# Patient Record
Sex: MALE | Race: WHITE | URBAN - METROPOLITAN AREA
[De-identification: names, ages, dates, MRNs, and addresses within clinical notes are randomized per-mention and may not be internally consistent; named-entity substitution may affect disease eponyms.]

---

## 2017-03-13 ENCOUNTER — TRANSFERRED RECORDS (OUTPATIENT)
Dept: HEALTH INFORMATION MANAGEMENT | Facility: CLINIC | Age: 57
End: 2017-03-13

## 2017-03-21 ENCOUNTER — TRANSFERRED RECORDS (OUTPATIENT)
Dept: HEALTH INFORMATION MANAGEMENT | Facility: CLINIC | Age: 57
End: 2017-03-21

## 2017-04-03 ENCOUNTER — TRANSFERRED RECORDS (OUTPATIENT)
Dept: HEALTH INFORMATION MANAGEMENT | Facility: CLINIC | Age: 57
End: 2017-04-03

## 2018-02-08 ENCOUNTER — TRANSFERRED RECORDS (OUTPATIENT)
Dept: HEALTH INFORMATION MANAGEMENT | Facility: CLINIC | Age: 58
End: 2018-02-08

## 2018-02-26 ENCOUNTER — TRANSFERRED RECORDS (OUTPATIENT)
Dept: HEALTH INFORMATION MANAGEMENT | Facility: CLINIC | Age: 58
End: 2018-02-26

## 2018-03-26 ENCOUNTER — PRE VISIT (OUTPATIENT)
Dept: UROLOGY | Facility: CLINIC | Age: 58
End: 2018-03-26

## 2018-03-26 DIAGNOSIS — N35.919 URETHRAL STRICTURE: Primary | ICD-10-CM

## 2018-03-26 NOTE — TELEPHONE ENCOUNTER
RECORDS RECEIVED FROM: [facility] UROLOGY IN PROCESS   DATE RECEIVED: IN PROCESS   NOTES STATUS DETAILS   OFFICE NOTE from referring provider {Records Status:889553    OFFICE NOTES from other specialists {Records Status:[id number]    DISCHARGE SUMMARY from hospital {Records Status:550707    DISCHARGE REPORT from the ER {Records Status:253953    OPERATIVE REPORT {Records Status:[id number]    MEDICATION LIST {Records Status:252378    LABS     URINALYSIS (UA) {Records Status:273720    URINE CYTOLOGY {Records Status:785765    DIAGNOSIS SPECIFIC LABS     RUG (IMAGES & REPORT) and VCUG (IMAGES & REPORT) REQUEST SENT TO [facility] UROLOGY.. CDK {Records Status:[id number] IN PROCESS

## 2018-04-16 ENCOUNTER — TELEPHONE (OUTPATIENT)
Dept: UROLOGY | Facility: CLINIC | Age: 58
End: 2018-04-16

## 2018-04-16 NOTE — TELEPHONE ENCOUNTER
----- Message from Vilma Clayton RN sent at 3/26/2018  2:27 PM CDT -----  Regarding: FW: NEW URETHRAL STX  Can you please schedule a VCUG and RUG. THanks   ----- Message -----     From: Mera Lopes     Sent: 3/26/2018   9:06 AM       To: Angelique West RN  Subject: NEW URETHRAL STX                                 Please put in order and send to .. ck

## 2018-05-04 ENCOUNTER — RADIANT APPOINTMENT (OUTPATIENT)
Dept: GENERAL RADIOLOGY | Facility: CLINIC | Age: 58
End: 2018-05-04
Attending: UROLOGY
Payer: COMMERCIAL

## 2018-05-04 DIAGNOSIS — N35.919 URETHRAL STRICTURE: ICD-10-CM

## 2018-05-04 ASSESSMENT — ENCOUNTER SYMPTOMS
DYSURIA: 1
HEMATURIA: 0
DIFFICULTY URINATING: 1
FLANK PAIN: 0

## 2018-05-08 ENCOUNTER — PRE VISIT (OUTPATIENT)
Dept: UROLOGY | Facility: CLINIC | Age: 58
End: 2018-05-08

## 2018-05-08 NOTE — TELEPHONE ENCOUNTER
Reason for visit: urethral stricture follow up     Relevant information: VCUG was unsuccessful. Pt has a history of prostate cancer with brachytherapy    Records/imaging/labs: RUG available    Pt called: yes, generic message left    Rooming: flow/pvr

## 2018-05-14 ENCOUNTER — OFFICE VISIT (OUTPATIENT)
Dept: UROLOGY | Facility: CLINIC | Age: 58
End: 2018-05-14
Payer: COMMERCIAL

## 2018-05-14 ENCOUNTER — ALLIED HEALTH/NURSE VISIT (OUTPATIENT)
Dept: UROLOGY | Facility: CLINIC | Age: 58
End: 2018-05-14
Payer: COMMERCIAL

## 2018-05-14 VITALS
DIASTOLIC BLOOD PRESSURE: 84 MMHG | HEART RATE: 69 BPM | BODY MASS INDEX: 29.63 KG/M2 | WEIGHT: 207 LBS | SYSTOLIC BLOOD PRESSURE: 197 MMHG | HEIGHT: 70 IN

## 2018-05-14 DIAGNOSIS — C61 MALIGNANT NEOPLASM OF PROSTATE (H): ICD-10-CM

## 2018-05-14 DIAGNOSIS — N99.112 POSTPROCEDURAL MEMBRANOUS URETHRAL STRICTURE: Primary | ICD-10-CM

## 2018-05-14 DIAGNOSIS — T66.XXXS RADIATION ADVERSE EFFECT, SEQUELA: ICD-10-CM

## 2018-05-14 RX ORDER — CEFAZOLIN SODIUM 1 G/50ML
1 INJECTION, SOLUTION INTRAVENOUS SEE ADMIN INSTRUCTIONS
Status: CANCELLED | OUTPATIENT
Start: 2018-05-14

## 2018-05-14 ASSESSMENT — PAIN SCALES - GENERAL: PAINLEVEL: NO PAIN (0)

## 2018-05-14 NOTE — MR AVS SNAPSHOT
After Visit Summary   5/14/2018    Jimmie Garcia    MRN: [id number]           Patient Information     Date Of Birth          1960        Visit Information        Provider Department      5/14/2018 8:00 AM Scottie Madera MD [facility]rology and [facility]        Today's Diagnoses     Postprocedural membranous urethral stricture    -  1    Radiation adverse effect, sequela        Malignant neoplasm of prostate (H)          Care Instructions    Patient scheduled surgery.      Amber Beal MA      It was a pleasure meeting with you today.  Thank you for allowing me and my team the privilege of caring for you today.  YOU are the reason we are here, and I truly hope we provided you with the excellent service you deserve.  Please let us know if there is anything else we can do for you so that we can be sure you are leaving completely satisfied with your care experience.                Follow-ups after your visit        Your next 10 appointments already scheduled     Jun 29, 2018   Procedure with Scottie Madera MD   [facility] ([facility])    [address] [place] [address]  [address] [place] [id number] [phone]           Located in the Clinics and Surgery Center at [address] [place] [address].   parking is very convenient and highly recommended.  is a $6 flat rate fee.  Both  and self parkers should enter the main arrival plaza from [facility]; parking attendants will direct you based on your parking preference.            Aug 06, 2018 10:30 AM CDT   (Arrive by 10:15 AM)   Post-Op with Adrian Castro MD   [facility] and [facility] ([facility])    [address] [place] [address]  [address]th [facility] [address] [phone]              Who to contact     Please call your clinic at [phone] to:    Ask questions  "about your health    Make or cancel appointments    Discuss your medicines    Learn about your test results    Speak to your doctor            Additional Information About Your Visit        Resource [employer] Information     [employer] gives you secure access to your electronic health record. If you see a primary care provider, you can also send messages to your care team and make appointments. If you have questions, please call your primary care clinic.  If you do not have a primary care provider, please call [phone] and they will assist you.      [employer] is an electronic gateway that provides easy, online access to your medical records. With [employer], you can request a clinic appointment, read your test results, renew a prescription or communicate with your care team.     To access your existing account, please contact your [facility] or call [phone] for assistance.        Care EveryWhere ID     This is your Care EveryWhere ID. This could be used by other organizations to access your [place] medical records  [id number]        Your Vitals Were     Pulse Height BMI (Body Mass Index)             69 1.765 m (5' 9.5\") 30.13 kg/m2          Blood Pressure from Last 3 Encounters:   05/14/18 197/84    Weight from Last 3 Encounters:   05/14/18 93.9 kg (207 lb)              We Performed the Following     COMPLEX UROFLOWMETRY     [place]le-Operative Worksheet  (Urology General)     POST-VOID RESIDUAL BLADDER SCAN        Primary Care Provider    None Specified       No primary provider on file.        Equal Access to Services     ELLI JERONIMO : Hadii chito cantu hadasho Soomaali, waaxda luqadaha, qaybta kaalmada adeegyada, tevin rehman . So [facility] [phone].    ATENCIÓN: Si habla español, tiene a almazan disposición servicios gratuitos de asistencia lingüística. Llame al [phone].    We comply with applicable federal civil rights laws and [place] laws. We do not discriminate on " the basis of race, color, national origin, age, disability, sex, sexual orientation, or gender identity.            Thank you!     Thank you for choosing [facility] UROLOGY AND [facility] FOR PROSTATE AND UROLOGIC CANCERS  for your care. Our goal is always to provide you with excellent care. Hearing back from our patients is one way we can continue to improve our services. Please take a few minutes to complete the written survey that you may receive in the mail after your visit with us. Thank you!             Your Updated Medication List - Protect others around you: Learn how to safely use, store and throw away your medicines at www.disposemymeds.org.          This list is accurate as of 5/14/18 12:48 PM.  Always use your most recent med list.                   Brand Name Dispense Instructions for use Diagnosis    MULTIPLE VITAMIN PO           omeprazole 20 MG CR capsule    priLOSEC     Take 20 mg by mouth

## 2018-05-14 NOTE — LETTER
5/14/2018       RE: Jimmie Garcia  [address]  [facility] [address]     Dear Colleague,    Thank you for referring your patient, Jimmie Garcia, to the [facility] UROLOGY AND INST FOR PROSTATE AND UROLOGIC CANCERS at [facility]. Please see a copy of my visit note below.    UROLOGY CONSULT HISTORY & PHYSICAL    Name: Jimmie Garcia    MRN: [id number]   YOB: 1960         CHIEF COMPLAINT:  Urethral stricture    HISTORY OF PRESENT ILLNESS:  Mr. Garcia is a 57 year old male seen in consultation from Dr. Valerio for urethral stricture. Symptoms include slow stream and sense of incomplete emptying. He has not had prior treatments for his urethral stricture.     He does not have a history of self dilation. He currently does not perform self dilation. He does not currently have an indwelling catheter.    Etiology: He has a history of prostate cancer s/p brachytherapy in 2012. He also has a history of a single low grade bladder tumor s/p TURBT in 2014 with no recurrences since; he has regularly been undergoing surveillance cystoscopy. He began having symptoms of weak stream and incomplete emptying in December 2017. He then underwent cystoscopy in March 2018 which showed a membranous stricture which could not be passed with the cystoscope. He denies a history of sexually transmitted diseases. He denies a history of straddle injury. He denies a history of pelvic fracture. He confirms a history of urethral catheterization or instrumentation as per above.    He currently has normal erectile function. He has occasional urge incontinence but can play an entire round of golf without needing to go to the restroom. He has no stress incontinence.     REVIEW OF QUESTIONNAIRES:  Questionnaires reviewed. See flowsheet for details.    PMH notable for GERD and prostate cancer and urethral stricture. .     No surgical history other than aforementioned brachytherapy and TURBT.     Current  "Outpatient Prescriptions   Medication     MULTIPLE VITAMIN PO     omeprazole (PRILOSEC) 20 MG CR capsule     No current facility-administered medications for this visit.      Facility-Administered Medications Ordered in Other Visits   Medication     iopamidol (ISOVUE-250) 51% solution 100 mL       Allergies as of 05/14/2018     (Not on File)       No family history on file.    Social History     Social History     Marital status:      Spouse name: N/A     Number of children: N/A     Years of education: N/A     Occupational History     Not on file.     Social History Main Topics     Smoking status: Former Smoker     Quit date: 04/1993     Smokeless tobacco: Never Used     Alcohol use Not on file     Drug use: Not on file     Sexual activity: Not on file     Other Topics Concern     Not on file     Social History Narrative     No narrative on file       REVIEW OF SYSTEMS:   See HPI for pertinent details.  Remainder of 10-point ROS negative.    PHYSICAL EXAM:  General: Well-dressed, well-nourished man in no acute distress  Vitals: /84  Pulse 69  Ht 1.765 m (5' 9.5\")  Wt 93.9 kg (207 lb)  BMI 30.13 kg/m2 Estimated body mass index is 30.13 kg/(m^2) as calculated from the following:    Height as of this encounter: 1.765 m (5' 9.5\").    Weight as of this encounter: 93.9 kg (207 lb).  Eyes: Anicteric, EOMI  Lymph: No cervical, supraclavicular lymphadenopathy  Lungs: No respiratory distress  Heart: Regular rate and rhythm  Abdomen: mildly obese, soft, nontender, without masses.  There are no surgical scars  Back: Flanks are nontender  : Circumcised phallus. Meatal stenosis is absent, penile plaques are absent. Skin lesions are absent.  There is not firmness along the course of the entire urethra.  Rectal examination was not performed  Neurologic: Grossly nonfocal    REVIEW OF IMAGING STUDIES:  Retrograde urethrogram and attempted voiding cystourethrogram were performed earlier and the images were " independently interpreted by me and are reviewed with the patient.  These demonstrate a membranous urethral stricture that is obliterative immediately distal to the apical brachytherapy seeds.  No contrast is seen proximal to the stricture.         REVIEW OF OFFICE STUDIES:  Urinary Flow Rate  Peak Flow: 10.4 mL/s  Average Flow: 3.7 mL/s  Voided (mL): 148 mL  Residual Volume by Ultrasound: 210 mL  Character of Curve: Plateau    REVIEW OF OUTSIDE RECORDS:  I reviewed outside records for 10 minutes.  Findings include: urethral stricture diagnosed on cystoscopy with Dr. Valerio in 3/2018.     ASSESSMENT/PLAN:  A 57 year old gentleman with urethral stricture after brachytherapy.  He has been managed by prior urologist(s) and is referred to our center for advanced treatment options.    -We discussed management options including urethral dilation versus urethroplasty versus salvage prostatectomy based on the extent of his stricture disease.   -Will plan for suprapubic tube placement with antegrade urethrogram and antegrade cystoscopy to assess proximal extent of stricture. We discussed that the exact nature of his surgery will depend on the findings of his antegrade studies but that he would have a urethral catheter in place for a minimum of 4-6 weeks following surgery. We discussed a general success rate of 80% for post-radiation urethroplasty but that we will discuss more specific details following the results of his antegrade studies.     Patient was seen and examined with Dr. Madera    --  =======================================================  As the attending surgeon I, Scottie Madera, interviewed and examined the patient. The plan was developed between me and the patient. My findings and plan are as stated by the resident.    MD Terry Hernandez MD  Urology Resident    7:52 AM, 5/14/2018

## 2018-05-14 NOTE — PROGRESS NOTES
UROLOGY CONSULT HISTORY & PHYSICAL    Name: Jimmie Garcia    MRN: [id number]   YOB: 1960         CHIEF COMPLAINT:  Urethral stricture    HISTORY OF PRESENT ILLNESS:  Mr. Garcia is a 57 year old male seen in consultation from Dr. Valerio for urethral stricture. Symptoms include slow stream and sense of incomplete emptying. He has not had prior treatments for his urethral stricture.     He does not have a history of self dilation. He currently does not perform self dilation. He does not currently have an indwelling catheter.    Etiology: He has a history of prostate cancer s/p brachytherapy in 2012. He also has a history of a single low grade bladder tumor s/p TURBT in 2014 with no recurrences since; he has regularly been undergoing surveillance cystoscopy. He began having symptoms of weak stream and incomplete emptying in December 2017. He then underwent cystoscopy in March 2018 which showed a membranous stricture which could not be passed with the cystoscope. He denies a history of sexually transmitted diseases. He denies a history of straddle injury. He denies a history of pelvic fracture. He confirms a history of urethral catheterization or instrumentation as per above.    He currently has normal erectile function. He has occasional urge incontinence but can play an entire round of golf without needing to go to the restroom. He has no stress incontinence.     REVIEW OF QUESTIONNAIRES:  Questionnaires reviewed. See flowsheet for details.    PMH notable for GERD and prostate cancer and urethral stricture. .     No surgical history other than aforementioned brachytherapy and TURBT.     Current Outpatient Prescriptions   Medication     MULTIPLE VITAMIN PO     omeprazole (PRILOSEC) 20 MG CR capsule     No current facility-administered medications for this visit.      Facility-Administered Medications Ordered in Other Visits   Medication     iopamidol (ISOVUE-250) 51% solution 100 mL       Allergies as of  "05/14/2018     (Not on File)       No family history on file.    Social History     Social History     Marital status:      Spouse name: N/A     Number of children: N/A     Years of education: N/A     Occupational History     Not on file.     Social History Main Topics     Smoking status: Former Smoker     Quit date: 04/1993     Smokeless tobacco: Never Used     Alcohol use Not on file     Drug use: Not on file     Sexual activity: Not on file     Other Topics Concern     Not on file     Social History Narrative     No narrative on file       REVIEW OF SYSTEMS:   See HPI for pertinent details.  Remainder of 10-point ROS negative.    PHYSICAL EXAM:  General: Well-dressed, well-nourished man in no acute distress  Vitals: /84  Pulse 69  Ht 1.765 m (5' 9.5\")  Wt 93.9 kg (207 lb)  BMI 30.13 kg/m2 Estimated body mass index is 30.13 kg/(m^2) as calculated from the following:    Height as of this encounter: 1.765 m (5' 9.5\").    Weight as of this encounter: 93.9 kg (207 lb).  Eyes: Anicteric, EOMI  Lymph: No cervical, supraclavicular lymphadenopathy  Lungs: No respiratory distress  Heart: Regular rate and rhythm  Abdomen: mildly obese, soft, nontender, without masses.  There are no surgical scars  Back: Flanks are nontender  : Circumcised phallus. Meatal stenosis is absent, penile plaques are absent. Skin lesions are absent.  There is not firmness along the course of the entire urethra.  Rectal examination was not performed  Neurologic: Grossly nonfocal    REVIEW OF IMAGING STUDIES:  Retrograde urethrogram and attempted voiding cystourethrogram were performed earlier and the images were independently interpreted by me and are reviewed with the patient.  These demonstrate a membranous urethral stricture that is obliterative immediately distal to the apical brachytherapy seeds.  No contrast is seen proximal to the stricture.         REVIEW OF OFFICE STUDIES:  Urinary Flow Rate  Peak Flow: 10.4 mL/s  Average " Flow: 3.7 mL/s  Voided (mL): 148 mL  Residual Volume by Ultrasound: 210 mL  Character of Curve: Plateau    REVIEW OF OUTSIDE RECORDS:  I reviewed outside records for 10 minutes.  Findings include: urethral stricture diagnosed on cystoscopy with Dr. Valerio in 3/2018.     ASSESSMENT/PLAN:  A 57 year old gentleman with urethral stricture after brachytherapy.  He has been managed by prior urologist(s) and is referred to our center for advanced treatment options.    -We discussed management options including urethral dilation versus urethroplasty versus salvage prostatectomy based on the extent of his stricture disease.   -Will plan for suprapubic tube placement with antegrade urethrogram and antegrade cystoscopy to assess proximal extent of stricture. We discussed that the exact nature of his surgery will depend on the findings of his antegrade studies but that he would have a urethral catheter in place for a minimum of 4-6 weeks following surgery. We discussed a general success rate of 80% for post-radiation urethroplasty but that we will discuss more specific details following the results of his antegrade studies.     Patient was seen and examined with Dr. Madera    --  =======================================================  As the attending surgeon I, Scottie Madera, interviewed and examined the patient. The plan was developed between me and the patient. My findings and plan are as stated by the resident.    MD Terry Hernandez MD  Urology Resident    7:52 AM, 5/14/2018    Answers for HPI/ROS submitted by the patient on 5/4/2018   General Symptoms: No  Skin Symptoms: No  HENT Symptoms: No  EYE SYMPTOMS: No  HEART SYMPTOMS: No  LUNG SYMPTOMS: No  INTESTINAL SYMPTOMS: No  URINARY SYMPTOMS: Yes  REPRODUCTIVE SYMPTOMS: No  SKELETAL SYMPTOMS: No  BLOOD SYMPTOMS: No  NERVOUS SYSTEM SYMPTOMS: No  MENTAL HEALTH SYMPTOMS: No  Trouble holding urine or incontinence: Yes  Pain or burning: Yes  Trouble  starting or stopping: Yes  Increased frequency of urination: Yes  Blood in urine: No  Decreased frequency of urination: No  Frequent nighttime urination: No  Flank pain: No  Difficulty emptying bladder: Yes

## 2018-05-14 NOTE — NURSING NOTE
"Chief Complaint   Patient presents with     Consult     Urethral stricture       Blood pressure 197/84, pulse 69, height 1.765 m (5' 9.5\"), weight 93.9 kg (207 lb). Body mass index is 30.13 kg/(m^2).    There is no problem list on file for this patient.      Not on File    Current Outpatient Prescriptions   Medication Sig Dispense Refill     MULTIPLE VITAMIN PO        omeprazole (PRILOSEC) 20 MG CR capsule Take 20 mg by mouth         Social History   Substance Use Topics     Smoking status: Former Smoker     Quit date: 04/1993     Smokeless tobacco: Never Used     Alcohol use Not on file       ANA M Mcnamara  5/14/2018  7:25 AM       "

## 2018-05-14 NOTE — PROGRESS NOTES
Pre Op Teaching Flowsheet       Pre and Post op Patient Education  Relevant Diagnosis:  Postprocedural membranous urethral stricture   Teaching Topic:  Pre and post op teaching for Cystoscopy and insertion of suprapubic cystostomy tube  Person Involved in teaching:  Jimmie Garcia      Motivation Level:  Asks Questions: Yes  Eager to Learn:  Yes  Cooperative: Yes  Receptive (willing/able to accept information):  Yes  Patient demonstrates understanding of the following:  Date and time of surgery:  6/29/18 at 0910  Location of surgery: [facility]- 5th Floor  History and Physical and any other testing necessary prior to surgery: Yes  Required time line for completion of History and Physical and any pre-op testing: Yes    NPO Guidelines: NPO per Anesthesia Guidelines    Patient demonstrates understanding of the following:  Patient understands the need for a responsible adult to drive them home and someone to stay with them for the first 24 hours post-operatively: YES Wife  Pre-op bowel prep: No, explain  Pre-op showering/scrub information with Hibiclens Soap: Yes  Medications to take the day of surgery:  Per PCP  Blood thinner medications discussed and when to stop (if applicable):  Yes  Diabetes medication management (if applicable):  Patient to discuss with Primary Care Provider  Discussed pain control after surgery: pain scale, pain medications and pain management techniques  Infection Prevention: Patient demonstrates understanding of the following:  Patient instructed on hand hygiene:  Yes  Surgical procedure site care taught: Yes  Signs and symptoms of infection taught:  Yes  Wound care will be taught at the time of discharge.  Central venous catheter care will be taught at the time of discharge (if applicable).    Post-op follow-up:  Discussed how to contact the hospital, nurse, and clinic scheduling staff if necessary.    Instructional materials used/given/mailed:  [facility]  Surgery Booklet, post op teaching sheet, Map, Soap, and arrival/location information.    Surgical instructions given to patient in clinic: Yes.    Instructional Materials given:  Before your surgery packet , Medications to avoid before surgery , Showering or Bathing instructions before surgery  and What to expect after surgery    Post-op appointment/testing scheduled per MD orders: Yes, 8/6/18 at 1030 with Dr. Castro    Total time with patient: 10 minutes    Angelique West RN  Urology Care Coordinator

## 2018-05-14 NOTE — MR AVS SNAPSHOT
After Visit Summary   5/14/2018    Jimmie Garcia    MRN: [id number]           Patient Information     Date Of Birth          1960        Visit Information        Provider Department      5/14/2018 9:00 AM Nurse, [facility]        Today's Diagnoses     Postprocedural membranous urethral stricture    -  1       Follow-ups after your visit        Your next 10 appointments already scheduled     Jun 29, 2018   Procedure with Scottie Madera MD   [facility] ([facility])    [address] [place] [address]  5th [facility] [address] [phone]           Located in the Clinics and Surgery Center at [address] [place] [address].   parking is very convenient and highly recommended.  is a $6 flat rate fee.  Both  and self parkers should enter the main arrival plaza from [facility]; parking attendants will direct you based on your parking preference.            Aug 06, 2018  1:30 PM CDT   (Arrive by 1:15 PM)   Post-Op with Adrian Castro MD   [facility] ([facility])    [address] [place] [address]  4th [facility] [address] [phone]              Who to contact     Please call your clinic at [phone] to:    Ask questions about your health    Make or cancel appointments    Discuss your medicines    Learn about your test results    Speak to your doctor            Additional Information About Your Visit        MyChart Information     [employer] gives you secure access to your electronic health record. If you see a primary care provider, you can also send messages to your care team and make appointments. If you have questions, please call your primary care clinic.  If you do not have a primary care provider, please call [phone] and they will assist you.      [employer] is  an electronic gateway that provides easy, online access to your medical records. With [employer], you can request a clinic appointment, read your test results, renew a prescription or communicate with your care team.     To access your existing account, please contact your [facility] or call [phone] for assistance.        Care EveryWhere ID     This is your Care EveryWhere ID. This could be used by other organizations to access your [facility] medical records  [id number]         Blood Pressure from Last 3 Encounters:   05/14/18 197/84    Weight from Last 3 Encounters:   05/14/18 93.9 kg (207 lb)              Today, you had the following     No orders found for display       Primary Care Provider    None Specified       No primary provider on file.        Equal Access to Services     ELLI JERONIMO : Jarrett Jimenez, duane figueredo, temi alvarenga, tevin rehman . So [facility] [phone].    ATENCIÓN: Si habla español, tiene a almazan disposición servicios gratuitos de asistencia lingüística. Llame al [phone].    We comply with applicable federal civil rights laws and [place] laws. We do not discriminate on the basis of race, color, national origin, age, disability, sex, sexual orientation, or gender identity.            Thank you!     Thank you for choosing [facility] AND [facility]  for your care. Our goal is always to provide you with excellent care. Hearing back from our patients is one way we can continue to improve our services. Please take a few minutes to complete the written survey that you may receive in the mail after your visit with us. Thank you!             Your Updated Medication List - Protect others around you: Learn how to safely use, store and throw away your medicines at www.disposemymeds.org.          This list is accurate as of 5/14/18  9:18 AM.  Always use your most recent med  list.                   Brand Name Dispense Instructions for use Diagnosis    MULTIPLE VITAMIN PO           omeprazole 20 MG CR capsule    priLOSEC     Take 20 mg by mouth

## 2018-05-14 NOTE — PATIENT INSTRUCTIONS
Patient scheduled surgery.      Amber Beal MA      It was a pleasure meeting with you today.  Thank you for allowing me and my team the privilege of caring for you today.  YOU are the reason we are here, and I truly hope we provided you with the excellent service you deserve.  Please let us know if there is anything else we can do for you so that we can be sure you are leaving completely satisfied with your care experience.

## 2018-06-28 ENCOUNTER — ANESTHESIA EVENT (OUTPATIENT)
Dept: SURGERY | Facility: AMBULATORY SURGERY CENTER | Age: 58
End: 2018-06-28

## 2018-06-29 ENCOUNTER — ANESTHESIA (OUTPATIENT)
Dept: SURGERY | Facility: AMBULATORY SURGERY CENTER | Age: 58
End: 2018-06-29

## 2018-06-29 ENCOUNTER — RADIANT APPOINTMENT (OUTPATIENT)
Dept: RADIOLOGY | Facility: AMBULATORY SURGERY CENTER | Age: 58
End: 2018-06-29
Attending: UROLOGY

## 2018-06-29 ENCOUNTER — SURGERY (OUTPATIENT)
Age: 58
End: 2018-06-29

## 2018-06-29 ENCOUNTER — HOSPITAL ENCOUNTER (OUTPATIENT)
Facility: AMBULATORY SURGERY CENTER | Age: 58
End: 2018-06-29
Attending: UROLOGY
Payer: COMMERCIAL

## 2018-06-29 ENCOUNTER — TELEPHONE (OUTPATIENT)
Dept: UROLOGY | Facility: CLINIC | Age: 58
End: 2018-06-29

## 2018-06-29 VITALS
OXYGEN SATURATION: 100 % | HEIGHT: 70 IN | HEART RATE: 62 BPM | TEMPERATURE: 96.8 F | BODY MASS INDEX: 29.35 KG/M2 | WEIGHT: 205 LBS | RESPIRATION RATE: 16 BRPM | DIASTOLIC BLOOD PRESSURE: 70 MMHG | SYSTOLIC BLOOD PRESSURE: 123 MMHG

## 2018-06-29 DIAGNOSIS — N35.919 URETHRAL STRICTURE: ICD-10-CM

## 2018-06-29 DIAGNOSIS — N35.014 POST-TRAUMATIC MALE URETHRAL STRICTURE: Primary | ICD-10-CM

## 2018-06-29 DIAGNOSIS — N35.919 URETHRAL STRICTURE: Primary | ICD-10-CM

## 2018-06-29 DIAGNOSIS — N35.913 MEMBRANOUS URETHRAL STRICTURE: Primary | ICD-10-CM

## 2018-06-29 RX ORDER — SODIUM CHLORIDE, SODIUM LACTATE, POTASSIUM CHLORIDE, CALCIUM CHLORIDE 600; 310; 30; 20 MG/100ML; MG/100ML; MG/100ML; MG/100ML
INJECTION, SOLUTION INTRAVENOUS CONTINUOUS
Status: DISCONTINUED | OUTPATIENT
Start: 2018-06-29 | End: 2018-06-30 | Stop reason: HOSPADM

## 2018-06-29 RX ORDER — SODIUM CHLORIDE, SODIUM LACTATE, POTASSIUM CHLORIDE, CALCIUM CHLORIDE 600; 310; 30; 20 MG/100ML; MG/100ML; MG/100ML; MG/100ML
INJECTION, SOLUTION INTRAVENOUS CONTINUOUS
Status: DISCONTINUED | OUTPATIENT
Start: 2018-06-29 | End: 2018-06-29 | Stop reason: HOSPADM

## 2018-06-29 RX ORDER — ONDANSETRON 2 MG/ML
INJECTION INTRAMUSCULAR; INTRAVENOUS PRN
Status: DISCONTINUED | OUTPATIENT
Start: 2018-06-29 | End: 2018-06-29

## 2018-06-29 RX ORDER — PROPOFOL 10 MG/ML
INJECTION, EMULSION INTRAVENOUS CONTINUOUS PRN
Status: DISCONTINUED | OUTPATIENT
Start: 2018-06-29 | End: 2018-06-29

## 2018-06-29 RX ORDER — SODIUM CHLORIDE, SODIUM LACTATE, POTASSIUM CHLORIDE, CALCIUM CHLORIDE 600; 310; 30; 20 MG/100ML; MG/100ML; MG/100ML; MG/100ML
INJECTION, SOLUTION INTRAVENOUS CONTINUOUS PRN
Status: DISCONTINUED | OUTPATIENT
Start: 2018-06-29 | End: 2018-06-29

## 2018-06-29 RX ORDER — PROPOFOL 10 MG/ML
INJECTION, EMULSION INTRAVENOUS PRN
Status: DISCONTINUED | OUTPATIENT
Start: 2018-06-29 | End: 2018-06-29

## 2018-06-29 RX ORDER — BUPIVACAINE HYDROCHLORIDE 2.5 MG/ML
INJECTION, SOLUTION INFILTRATION; PERINEURAL PRN
Status: DISCONTINUED | OUTPATIENT
Start: 2018-06-29 | End: 2018-06-29 | Stop reason: HOSPADM

## 2018-06-29 RX ORDER — ONDANSETRON 2 MG/ML
4 INJECTION INTRAMUSCULAR; INTRAVENOUS EVERY 30 MIN PRN
Status: DISCONTINUED | OUTPATIENT
Start: 2018-06-29 | End: 2018-06-30 | Stop reason: HOSPADM

## 2018-06-29 RX ORDER — CEFAZOLIN SODIUM 1 G/50ML
1 INJECTION, SOLUTION INTRAVENOUS SEE ADMIN INSTRUCTIONS
Status: CANCELLED | OUTPATIENT
Start: 2018-06-29

## 2018-06-29 RX ORDER — ONDANSETRON 4 MG/1
4 TABLET, ORALLY DISINTEGRATING ORAL EVERY 30 MIN PRN
Status: DISCONTINUED | OUTPATIENT
Start: 2018-06-29 | End: 2018-06-30 | Stop reason: HOSPADM

## 2018-06-29 RX ORDER — DEXAMETHASONE SODIUM PHOSPHATE 4 MG/ML
INJECTION, SOLUTION INTRA-ARTICULAR; INTRALESIONAL; INTRAMUSCULAR; INTRAVENOUS; SOFT TISSUE PRN
Status: DISCONTINUED | OUTPATIENT
Start: 2018-06-29 | End: 2018-06-29

## 2018-06-29 RX ORDER — CEFAZOLIN SODIUM 1 G/3ML
INJECTION, POWDER, FOR SOLUTION INTRAMUSCULAR; INTRAVENOUS PRN
Status: DISCONTINUED | OUTPATIENT
Start: 2018-06-29 | End: 2018-06-29

## 2018-06-29 RX ORDER — AMOXICILLIN 250 MG
1-2 CAPSULE ORAL DAILY PRN
Qty: 20 TABLET | Refills: 0 | Status: ON HOLD | OUTPATIENT
Start: 2018-06-29 | End: 2018-08-16

## 2018-06-29 RX ORDER — OXYCODONE HYDROCHLORIDE 5 MG/1
5 TABLET ORAL EVERY 6 HOURS PRN
Qty: 8 TABLET | Refills: 0 | Status: ON HOLD | OUTPATIENT
Start: 2018-06-29 | End: 2018-08-16

## 2018-06-29 RX ORDER — ACETAMINOPHEN 325 MG/1
975 TABLET ORAL ONCE
Status: COMPLETED | OUTPATIENT
Start: 2018-06-29 | End: 2018-06-29

## 2018-06-29 RX ORDER — GABAPENTIN 300 MG/1
300 CAPSULE ORAL ONCE
Status: COMPLETED | OUTPATIENT
Start: 2018-06-29 | End: 2018-06-29

## 2018-06-29 RX ORDER — NALOXONE HYDROCHLORIDE 0.4 MG/ML
.1-.4 INJECTION, SOLUTION INTRAMUSCULAR; INTRAVENOUS; SUBCUTANEOUS
Status: DISCONTINUED | OUTPATIENT
Start: 2018-06-29 | End: 2018-06-30 | Stop reason: HOSPADM

## 2018-06-29 RX ADMIN — PROPOFOL 40 MG: 10 INJECTION, EMULSION INTRAVENOUS at 09:25

## 2018-06-29 RX ADMIN — PROPOFOL 60 MG: 10 INJECTION, EMULSION INTRAVENOUS at 09:22

## 2018-06-29 RX ADMIN — ACETAMINOPHEN 975 MG: 325 TABLET ORAL at 07:28

## 2018-06-29 RX ADMIN — PROPOFOL 100 MCG/KG/MIN: 10 INJECTION, EMULSION INTRAVENOUS at 09:20

## 2018-06-29 RX ADMIN — PROPOFOL 20 MG: 10 INJECTION, EMULSION INTRAVENOUS at 09:31

## 2018-06-29 RX ADMIN — BUPIVACAINE HYDROCHLORIDE 10 ML: 2.5 INJECTION, SOLUTION INFILTRATION; PERINEURAL at 10:01

## 2018-06-29 RX ADMIN — DEXAMETHASONE SODIUM PHOSPHATE 4 MG: 4 INJECTION, SOLUTION INTRA-ARTICULAR; INTRALESIONAL; INTRAMUSCULAR; INTRAVENOUS; SOFT TISSUE at 09:33

## 2018-06-29 RX ADMIN — SODIUM CHLORIDE, SODIUM LACTATE, POTASSIUM CHLORIDE, CALCIUM CHLORIDE: 600; 310; 30; 20 INJECTION, SOLUTION INTRAVENOUS at 08:55

## 2018-06-29 RX ADMIN — CEFAZOLIN SODIUM 2 G: 1 INJECTION, POWDER, FOR SOLUTION INTRAMUSCULAR; INTRAVENOUS at 09:29

## 2018-06-29 RX ADMIN — PROPOFOL 30 MG: 10 INJECTION, EMULSION INTRAVENOUS at 09:37

## 2018-06-29 RX ADMIN — ONDANSETRON 4 MG: 2 INJECTION INTRAMUSCULAR; INTRAVENOUS at 09:33

## 2018-06-29 RX ADMIN — PROPOFOL 30 MG: 10 INJECTION, EMULSION INTRAVENOUS at 09:40

## 2018-06-29 RX ADMIN — GABAPENTIN 300 MG: 300 CAPSULE ORAL at 07:28

## 2018-06-29 NOTE — IP AVS SNAPSHOT
[facility]    [address] [place] [address]    Phone:  [phone]    Fax:  [phone]                                       After Visit Summary   6/29/2018    Jimmie Garcia    MRN: [id number]           After Visit Summary Signature Page     I have received my discharge instructions, and my questions have been answered. I have discussed any challenges I see with this plan with the nurse or doctor.    ..........................................................................................................................................  Patient/Patient Representative Signature      ..........................................................................................................................................  Patient Representative Print Name and Relationship to Patient    ..................................................               ................................................  Date                                            Time    ..........................................................................................................................................  Reviewed by Signature/Title    ...................................................              ..............................................  Date                                                            Time

## 2018-06-29 NOTE — ANESTHESIA PREPROCEDURE EVALUATION
Anesthesia Evaluation     . Pt has had prior anesthetic.     No history of anesthetic complications          ROS/MED HX    ENT/Pulmonary:  - neg pulmonary ROS     Neurologic:  - neg neurologic ROS     Cardiovascular:  - neg cardiovascular ROS       METS/Exercise Tolerance:  >4 METS   Hematologic:  - neg hematologic  ROS       Musculoskeletal:  - neg musculoskeletal ROS       GI/Hepatic:  - neg GI/hepatic ROS       Renal/Genitourinary:  - ROS Renal section negative       Endo:  - neg endo ROS       Psychiatric:  - neg psychiatric ROS       Infectious Disease:  - neg infectious disease ROS       Malignancy:         Other:    - neg other ROS                 Physical Exam  Normal systems: dental    Airway   Mallampati: I  TM distance: >3 FB  Neck ROM: full    Dental     Cardiovascular   Rhythm and rate: regular      Pulmonary    breath sounds clear to auscultation                    Anesthesia Plan      History & Physical Review  History and physical reviewed and following examination; no interval change.    ASA Status:  2 .    NPO Status:  > 8 hours    Plan for MAC Maintenance will be TIVA.  Reason for MAC:  Deep or markedly invasive procedure (G8)  PONV prophylaxis:  Ondansetron (or other 5HT-3)       Postoperative Care  Postoperative pain management:  Multi-modal analgesia.      Consents  Anesthetic plan, risks, benefits and alternatives discussed with:  Patient.  Use of blood products discussed: No .   .                          .

## 2018-06-29 NOTE — ANESTHESIA CARE TRANSFER NOTE
Patient: Jimmie Garcia    Procedure(s):  Cystoscopy and Insertion of Suprapubic Cystostomy Tube, cystogram - Wound Class: II-Clean Contaminated    Diagnosis: Neurogenic Bladder  Diagnosis Additional Information: No value filed.    Anesthesia Type:   MAC     Note:  Airway :Room Air  Patient transferred to:PACU  Comments: To  PAR awake and ventilating well color pink  VSS  Report to nurses  Marcus Finley  CRNAHandoff Report: Identifed the Patient, Identified the Reponsible Provider, Reviewed the pertinent medical history, Discussed the surgical course, Reviewed Intra-OP anesthesia mangement and issues during anesthesia, Set expectations for post-procedure period and Allowed opportunity for questions and acknowledgement of understanding      Vitals: (Last set prior to Anesthesia Care Transfer)    CRNA VITALS  6/29/2018 0933 - 6/29/2018 1012      6/29/2018             Resp Rate (set): 10                Electronically Signed By: ÁLVARO THRASHER CRNA  June 29, 2018  10:12 AM

## 2018-06-29 NOTE — ANESTHESIA POSTPROCEDURE EVALUATION
Patient: Jimmie Garcia    Procedure(s):  Cystoscopy and Insertion of Suprapubic Cystostomy Tube, cystogram - Wound Class: II-Clean Contaminated    Diagnosis:Neurogenic Bladder  Diagnosis Additional Information: No value filed.    Anesthesia Type:  MAC    Note:  Anesthesia Post Evaluation    Patient location during evaluation: Phase 2  Patient participation: Able to fully participate in evaluation  Level of consciousness: awake  Pain management: adequate  Airway patency: patent  Cardiovascular status: acceptable  Respiratory status: acceptable  Hydration status: acceptable  PONV: none             Last vitals:  Vitals:    06/29/18 0706 06/29/18 1015 06/29/18 1036   BP: 132/88 107/63 123/70   Pulse: 62     Resp: 18 16 16   Temp: 36.5  C (97.7  F) 36.3  C (97.3  F) 36  C (96.8  F)   SpO2: 97% 97% 100%         Electronically Signed By: Moustapha Buckley MD  June 29, 2018  12:24 PM

## 2018-06-29 NOTE — DISCHARGE INSTRUCTIONS
"[facility]  Home Care Following Anesthesia  For 24 hours after surgery:  1. Get plenty of rest.  A responsible adult must stay with you for at least 24 hours after you leave the surgery center.  2. Do not drive or use heavy equipment.  If you have weakness or tingling, don't drive or use heavy equipment until this feeling goes away.   3. Do not drink alcohol.   4. Avoid strenuous or risky activities.  Ask for help when climbing stairs.  5. You may feel lightheaded.  IF so, sit for a few minutes before standing.  Have someone help you get up.   6. If you have nausea (feel sick to your stomach): Drink only clear liquids such as apple juice, ginger ale, broth or 7-Up.  Rest may also help.  Be sure to drink enough fluids.  Move to a regular diet as you feel able.   7. You may have a slight fever.  Call the doctor if your fever is over 100 F (37.7 C) (taken under the tongue) or lasts longer than 24 hours.  8. You may have a dry mouth, a sore throat, muscle aches or trouble sleeping. These should go away after 24 hours.  9. Do not make important or legal decisions.        Today you received a Marcaine or bupivacaine block to numb the nerves near your surgery site.  This is a block using local anesthetic or \"numbing\" medication injected around the nerves to anesthetize or \"numb\" the area supplied by those nerves.  This block is injected into the muscle layer near your surgical site.  The medication may numb the location where you had surgery for 6-18 hours, but may last up to 24 hours.  If your surgical site is an arm or leg you should be careful with your affected limb, since it is possible to injure your limb without being aware of it due to the numbing.  Until full feeling returns, you should guard against bumping or hitting your limb, and avoid extreme hot or cold temperatures on the skin.  As the block wears off, the feeling will return as a tingling or prickly sensation near your " surgical site.  You will experience more discomfort from your incision as the feeling returns.  You may want to take a pain pill (a narcotic or Tylenol if this was prescribed by your surgeon) when you start to experience mild pain before the pain beccomes more severe.  If your pain medications do not control your pain you should notifiy your surgeon.    Tips for taking pain medications  To get the best pain relief possible, remember these points:    Take pain medications as directed, before pain becomes severe.    Pain medication can upset your stomach: taking it with food may help.    Constipation is a common side effect of pain medication. Drink plenty of  fluids.    Eat foods high in fiber. Take a stool softener if recommended by your doctor or pharmacist.    Do not drink alcohol, drive or operate machinery while taking pain medications.    Ask about other ways to control pain, such as with heat, ice or relaxation.    Tylenol/Acetaminophen Consumption  To help encourage the safe use of acetaminophen, the makers of TYLENOL  have lowered the maximum daily dose for single-ingredient Extra Strength TYLENOL  (acetaminophen) products sold in the [place] from 8 pills per day (4,000 mg) to 6 pills per day (3,000 mg). The dosing interval has also changed from 2 pills every 4-6 hours to 2 pills every 6 hours.    If you feel your pain relief is insufficient, you may take Tylenol/Acetaminophen in addition to your narcotic pain medication.     Be careful not to exceed 3,000 mg of Tylenol/Acetaminophen in a 24 hour period from all sources.    If you are taking extra strength Tylenol/acetaminophen (500 mg), the maximum dose is 6 tablets in 24 hours.    If you are taking regular strength acetaminophen (325 mg), the maximum dose is 9 tablets in 24 hours.    Call a doctor for any of the followin. Signs of infection (fever, growing tenderness at the surgery site, a large amount of drainage or bleeding, severe pain, foul-smelling  drainage, redness, swelling).  2. It has been over 8 to 10 hours since surgery and you are still not able to urinate (pass water).  3. Headache for over 24 hours.  4. Numbness, tingling or weakness the day after surgery (if you had spinal anesthesia).  Your doctor is:  Dr. Scottie Hill, Prostate and Urology: [phone]                    Or dial [phone] and ask for the resident on call for:  Prostate Urology  For emergency care, call the:  [place] [facility]:  [phone] (TTY for hearing impaired: [phone])

## 2018-06-29 NOTE — OP NOTE
OPERATIVE NOTE    PREOPERATIVE DIAGNOSIS:  Post-brachytherapy urethral stricture  POSTOPERATIVE DIAGNOSIS:  Same    PROCEDURES PERFORMED:   1. Suprapubic cystostomy with suprapubic tube placement under ultrasound guidance.   2. Flexible cystoscopy  3. Cystogram    STAFF SURGEON: Scottie Madera MD     ASSISTANT: Terry Fowler MD    ANESTHESIA: MAC and local    ESTIMATED BLOOD LOSS: 5 cc     COMPLICATIONS: None.     TUBES: 16 Fr SP tube    SIGNIFICANT FINDINGS: 5 Fr stricture at membranous urethra. 16 Fr SP tube placed in standard fashion under ultrasound guidance. Large capacity bladder with diverticula on antegrade cystogram, unable to visualize bladder neck or anterior urethra.      BRIEF HISTORY OF PRESENT ILLNESS: Jimmie Garcia is a(n) 58 year old male with a history of post-brachytherapy prostatic urethral stricture. We discussed options including urinary management with a suprapubic tube for bladder drainage in anticipation of future reconstructive surgery. Patient understood risks, benefits and alternatives and agreed to proceed.    DESCRIPTION OF PROCEDURE: After obtaining informed consent, correctly identifying and marking the patient and confirming, preoperative antibiotics were given. He was brought back to the operating room where anesthesia was induced.  He was then prepped and draped in the standard sterile fashion in the dorsal lithotomy position.     We began the case by instilling approximately 400 cc of sterile saline into the bladder using a flexible cystoscope. The urethra was normal to the level of the membranous urethra where a 5 Fr stricture was encountered. Next, using ultrasound guidance, we localized the bladder. Local anesthetic was instilled subcutaneously.  We then made a 1 centimeter incision with #11 blade scalpel approximately 2-3 fingerbreadths above the pubic bone. We then used a spinal needle to find a good trajectory under ultrasound guidance into the bladder. Once we isolated this  trajectory, we then inserted the Omar trocar in through the incision until urine returned and under vision we were in the bladder. We then removed the inner cannula of the trocar and placed the 16-Bel[place]ian Mancilla catheter through the trocar and inflated the catheter with 10 mL in the balloon. A cystogram was then performed using roughly 400cc of dilute contrast. This revealed a large capacity, trabeculated bladder with no contrast visible at the bladder neck or in the anterior urethra.     A dressing was placed around the suprapubic catheter and a drainage bag was attached. A single 4-0 monocryl interrupted suture was placed into the SP tube wound to narrow the incision. The catheter was secured to his abdomen. The patient was awakened from anesthesia in stable condition.

## 2018-06-29 NOTE — IP AVS SNAPSHOT
MRN:[id number]                      After Visit Summary   6/29/2018    Jimmie Garcia    MRN: [id number]           Thank you!     Thank you for choosing [facility] for your care. Our goal is always to provide you with excellent care. Hearing back from our patients is one way we can continue to improve our services. Please take a few minutes to complete the written survey that you may receive in the mail after you visit with us. Thank you!        Patient Information     Date Of Birth          1960        About your hospital stay     You were admitted on:  June 29, 2018 [facility]    You were discharged on:  June 29, 2018       Who to Call     For medical emergencies, please call 911.  For non-urgent questions about your medical care, please call your primary care provider or clinic, None  For questions related to your surgery, please call your surgery clinic        Attending Provider     Provider Specialty    Scottie Madera MD Urology       Primary Care Provider    None Specified      After Care Instructions     Discharge Instructions       Activity  - Do not strain with bowel movements.  - Do not drive until you can press the brake pedal quickly and fully without pain.   - Do not operate a motor vehicle while taking narcotic pain medications.     Catheter  - You have a suprapubic tube in place. This can cause some discomfort, including some blood in your urine (which is normal), the feeling or urge to urinate frequently, and pressure/discomfort in your back while voiding. Stay well hydrated to keep your urine as clear as possible. Make sure your catheter is secured to your body at all times so that it does not accidentally get tugged out.     Medications  - Transition from narcotic pain medications to tylenol (acetaminophen) as you are able.  Wean yourself off all pain medications as you are able.  - Some pain medications contain  "both tylenol (acetaminophen) and a narcotic (Norco, vicodin, percocet), do not take more than 4,000mg of Tylenol (acetaminophen) from all sources in any 24 hour period.  - Narcotics can make you constipated.  Take over the counter fiber (metamucil or benefiber) and stool softeners (miralax, docusate or senna) while taking narcotic pain medications, but stop if you develop diarrhea.  - No driving or operating machinery while taking narcotic pain medications     Follow-Up:  - Follow up with Dr. Madera in 1 month with repeat X-ray studies prior.  - Call your primary care provider to touch base regarding your recent procedure.   - Call or return sooner than your regularly scheduled visit if you develop any of the following: fever (greater than 101.5), uncontrolled pain, uncontrolled nausea or vomiting, as well as increased redness, swelling, or drainage from your wound.     Phone numbers:   - Monday through Friday 8am to 4:30pm: Call [phone] with questions or to schedule or confirm appointment.    - Nights or weekends: call the after hours emergency pager - [phone] and tell the  \"I would like to page the Urology Resident on call.\"  - For emergencies, call [phone]                  Your next 10 appointments already scheduled     Aug 06, 2018 10:30 AM CDT   (Arrive by 10:15 AM)   Post-Op with Adrian Castro MD   [facility] ([facility])    [address] [address] [phone]              Further instructions from your care team       [facility]  Home Care Following Anesthesia  For 24 hours after surgery:  1. Get plenty of rest.  A responsible adult must stay with you for at least 24 hours after you leave the surgery center.  2. Do not drive or use heavy equipment.  If you have weakness or tingling, don't drive or use heavy equipment until this feeling goes " "away.   3. Do not drink alcohol.   4. Avoid strenuous or risky activities.  Ask for help when climbing stairs.  5. You may feel lightheaded.  IF so, sit for a few minutes before standing.  Have someone help you get up.   6. If you have nausea (feel sick to your stomach): Drink only clear liquids such as apple juice, ginger ale, broth or 7-Up.  Rest may also help.  Be sure to drink enough fluids.  Move to a regular diet as you feel able.   7. You may have a slight fever.  Call the doctor if your fever is over 100 F (37.7 C) (taken under the tongue) or lasts longer than 24 hours.  8. You may have a dry mouth, a sore throat, muscle aches or trouble sleeping. These should go away after 24 hours.  9. Do not make important or legal decisions.        Today you received a Marcaine or bupivacaine block to numb the nerves near your surgery site.  This is a block using local anesthetic or \"numbing\" medication injected around the nerves to anesthetize or \"numb\" the area supplied by those nerves.  This block is injected into the muscle layer near your surgical site.  The medication may numb the location where you had surgery for 6-18 hours, but may last up to 24 hours.  If your surgical site is an arm or leg you should be careful with your affected limb, since it is possible to injure your limb without being aware of it due to the numbing.  Until full feeling returns, you should guard against bumping or hitting your limb, and avoid extreme hot or cold temperatures on the skin.  As the block wears off, the feeling will return as a tingling or prickly sensation near your surgical site.  You will experience more discomfort from your incision as the feeling returns.  You may want to take a pain pill (a narcotic or Tylenol if this was prescribed by your surgeon) when you start to experience mild pain before the pain beccomes more severe.  If your pain medications do not control your pain you should notifiy your surgeon.    Tips for " taking pain medications  To get the best pain relief possible, remember these points:    Take pain medications as directed, before pain becomes severe.    Pain medication can upset your stomach: taking it with food may help.    Constipation is a common side effect of pain medication. Drink plenty of  fluids.    Eat foods high in fiber. Take a stool softener if recommended by your doctor or pharmacist.    Do not drink alcohol, drive or operate machinery while taking pain medications.    Ask about other ways to control pain, such as with heat, ice or relaxation.    Tylenol/Acetaminophen Consumption  To help encourage the safe use of acetaminophen, the makers of TYLENOL  have lowered the maximum daily dose for single-ingredient Extra Strength TYLENOL  (acetaminophen) products sold in the [place] from 8 pills per day (4,000 mg) to 6 pills per day (3,000 mg). The dosing interval has also changed from 2 pills every 4-6 hours to 2 pills every 6 hours.    If you feel your pain relief is insufficient, you may take Tylenol/Acetaminophen in addition to your narcotic pain medication.     Be careful not to exceed 3,000 mg of Tylenol/Acetaminophen in a 24 hour period from all sources.    If you are taking extra strength Tylenol/acetaminophen (500 mg), the maximum dose is 6 tablets in 24 hours.    If you are taking regular strength acetaminophen (325 mg), the maximum dose is 9 tablets in 24 hours.    Call a doctor for any of the followin. Signs of infection (fever, growing tenderness at the surgery site, a large amount of drainage or bleeding, severe pain, foul-smelling drainage, redness, swelling).  2. It has been over 8 to 10 hours since surgery and you are still not able to urinate (pass water).  3. Headache for over 24 hours.  4. Numbness, tingling or weakness the day after surgery (if you had spinal anesthesia).  Your doctor is:  Dr. Scottie Hill, Prostate and Urology: [phone]                    Or dial [phone] and  "ask for the resident on call for:  Prostate Urology  For emergency care, call the:  [facility]:  [phone] (TTY for hearing impaired: [phone])                  Pending Results     Date and Time Order Name Status Description    6/29/2018 0927 XR SURGERY MICHEL FLUORO LESS THAN 5 MIN W STILLS In process             Admission Information     Date & Time Provider Department Dept. Phone    6/29/2018 Scottie Madera MD [facility] [phone]      Your Vitals Were     Blood Pressure Pulse Temperature Respirations Height Weight    107/63 62 97.3  F (36.3  C) (Temporal) 16 1.765 m (5' 9.5\") 93 kg (205 lb)    Pulse Oximetry BMI (Body Mass Index)                97% 29.84 kg/m2          [email or web address] Information     [employer] gives you secure access to your electronic health record. If you see a primary care provider, you can also send messages to your care team and make appointments. If you have questions, please call your primary care clinic.  If you do not have a primary care provider, please call [phone] and they will assist you.      [employer] is an electronic gateway that provides easy, online access to your medical records. With [employer], you can request a clinic appointment, read your test results, renew a prescription or communicate with your care team.     To access your existing account, please contact your [facility] or call [phone] for assistance.        Care EveryWhere ID     This is your Care EveryWhere ID. This could be used by other organizations to access your [facility] medical records  [id number]        Equal Access to Services     ELLI JERONIMO : Hadii chito cantu hadasho Soomaali, waaxda luqadaha, qaybta kaalmada adeyudyyabri, tevin idiin hayaan adeeg kharash la'aan . So [facility] [phone].    ATENCIÓN: Si habla español, tiene a almazan disposición servicios gratuitos de asistencia lingüística. Llame al [phone].    We comply " with applicable federal civil rights laws and [place] laws. We do not discriminate on the basis of race, color, national origin, age, disability, sex, sexual orientation, or gender identity.               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        Dose:  1-2 tablet   Take 1-2 tablets by mouth daily as needed for constipation   Quantity:  20 tablet   Refills:  0         CONTINUE these medicines which have NOT CHANGED        Dose / Directions    MULTIPLE VITAMIN PO        Refills:  0       omeprazole 20 MG CR capsule   Commonly known as:  priLOSEC        Dose:  20 mg   Take 20 mg by mouth   Refills:  0       TYLENOL PO        Dose:  325 mg   Take 325 mg by mouth   Refills:  0            Where to get your medicines      These medications were sent to [address] [address]    Hours:  TRANSPLANT PHONE NUMBER [phone] Phone:  [phone]     senna-docusate 8.6-50 MG per tablet         Some of these will need a paper prescription and others can be bought over the counter. Ask your nurse if you have questions.     Bring a paper prescription for each of these medications     oxyCODONE IR 5 MG tablet                Protect others around you: Learn how to safely use, store and throw away your medicines at www.disposemymeds.org.        Information about OPIOIDS     PRESCRIPTION OPIOIDS: WHAT YOU NEED TO KNOW   We gave you an opioid (narcotic) pain medicine. It is important to manage your  pain, but opioids are not always the best choice. You should first try all the other options your care team gave you. Take this medicine for as short a time (and as few doses) as possible.     These medicines have risks:    DO NOT drive when on new or higher doses of pain medicine. These medicines can affect your alertness and reaction times, and you could be arrested for driving under the influence (DUI). If you need to use opioids long-term, talk to your care team about driving.    DO NOT operate heave machinery    DO NOT do any other dangerous activities while taking these medicines.     DO NOT drink any alcohol while taking these medicines.      If the opioid prescribed includes acetaminophen, DO NOT take with any other medicines that contain acetaminophen. Read all labels carefully. Look for the word  acetaminophen  or  Tylenol.  Ask your pharmacist if you have questions or are unsure.    You can get addicted to pain medicines, especially if you have a history of addiction (chemical, alcohol or substance dependence). Talk to your care team about ways to reduce this risk.    Store your pills in a secure place, locked if possible. We will not replace any lost or stolen medicine. If you don t finish your medicine, please throw away (dispose) as directed by your pharmacist. The [place] Pollution Control Agency has more information about safe disposal: https://www.pca.[facility].mn.us/living-green/managing-unwanted-medications.     All opioids tend to cause constipation. Drink plenty of water and eat foods that have a lot of fiber, such as fruits, vegetables, prune juice, apple juice and high-fiber cereal. Take a laxative (Miralax, milk of magnesia, Colace, Senna) if you don t move your bowels at least every other day.              Medication List: This is a list of all your medications and when to take them. Check marks below indicate your daily home schedule. Keep this list as a reference.      Medications            "Morning Afternoon Evening Bedtime As Needed    MULTIPLE VITAMIN PO                                omeprazole 20 MG CR capsule   Commonly known as:  priLOSEC   Take 20 mg by mouth                                oxyCODONE IR 5 MG tablet   Commonly known as:  ROXICODONE   Take 1 tablet (5 mg) by mouth every 6 hours as needed for other (Moderate to Severe Pain)                                senna-docusate 8.6-50 MG per tablet   Commonly known as:  SENOKOT-S;PERICOLACE   Take 1-2 tablets by mouth daily as needed for constipation                                TYLENOL PO   Take 325 mg by mouth   Last time this was given:  975 mg on 6/29/2018  7:28 AM                                          More Information        Discharge Instructions: Caring for Your Suprapubic Catheter  You are going home with a suprapubic catheter in place. This tube is placed directly into the bladder through your abdomen to drain urine from your bladder. You were shown how to care for your catheter in the hospital. This sheet will help remind you of those steps and guidelines when you are at home.  Home care    Shower as necessary.     Change your dressing every day. Change the dressing more often if it falls off, becomes dirty, or has absorbed a lot of drainage.  Gather your supplies    Tape    Soap    Wash cloth    Wastebasket and plastic bag    Dressing sponges (4\" x 4\") that are cut or split prison into the middle  Remove the dressing and check for problems    Wash your hands thoroughly before and after all catheter care.    Gently remove the old dressing if you have one.  ? Don t pull on the tube.  ? Check the dressing for drainage. Notice whether anything looks unusual or smells bad.  ? Place your dressing in the plastic bag and throw it away in the wastebasket.    Now look at the place where the catheter leaves your body (exit site).  ? Note any swelling, bleeding, irritation, unusual or smelly drainage.  ? Also check for any sores next to " "the exit site. Sores form around the exit site if there is too much pressure from the tube on the skin.  Clean the area    Wash the area around the catheter exit site gently with soap and water.    Gently pat the area dry    Don't use powders, creams, or sprays near the exit site.    Place a split 4\" x 4\" sponge around the catheter. Tape it in place.  Follow-up care  Make a follow-up appointment or as advised.  When to call your healthcare provider  Call your health care provider right away if you have any of the following:    Catheter that falls out, or is clogged or feels clogged    Stitches that fall out    Urine leaking around catheter    Urine that is cloudy, bloody, or smells bad    No urine drainage    Bladder that feels full or painful    Rash, itching, redness, swelling, or drainage at the catheter site    Fever above 100.4 F (38. C) or shaking chills   Date Last Reviewed: 1/1/2017 2000-2017 [employer]. [address] [place] [id number]. All rights reserved. This information is not intended as a substitute for professional medical care. Always follow your healthcare professional's instructions.             "

## 2018-07-02 ENCOUNTER — TELEPHONE (OUTPATIENT)
Dept: UROLOGY | Facility: CLINIC | Age: 58
End: 2018-07-02

## 2018-07-02 NOTE — TELEPHONE ENCOUNTER
[facility] Call Center    Phone Message    May a detailed message be left on voicemail: yes    Reason for Call: Symptoms or Concerns     If patient has red-flag symptoms, warm transfer to triage line    Current symptom or concern: Pt got a super pubic catheter placed on Friday, he is having really bad bladder spasms and is wondering if he can get something to help. His pharmacy is [facility] [address], [place] [address]. Please call him back    Symptoms have been present for:  3 day(s)    Are there any new or worsening symptoms? Yes: Bladder spasms      Action Taken: Message routed to:  [facility] ([facility] Urology

## 2018-07-03 ENCOUNTER — CARE COORDINATION (OUTPATIENT)
Dept: UROLOGY | Facility: CLINIC | Age: 58
End: 2018-07-03

## 2018-07-03 NOTE — PROGRESS NOTES
Urology Postop Phone Note:    Mr. Jimmie Garcia is a 58 year old male who underwent Suprapubic cystostomy with suprapubic tube placement under ultrasound guidance, Flexible cystoscopy, and Cystogram on 6/29/18 with Dr. Madera for a history of Post-brachytherapy urethral stricture.  His postoperative course was unremarkable and the patient was d/c to home on 6/29/18.    Fevers/chills: Patient denies any fever or chills.  Eating/drinking: Patient is able to eat and drink without any complaints.  Bowel habits: Patient reports having a normal bowel movement.  Urine output Mancilla catheter Color Yellow Clarity Clear Odor None  Incisions: Patient denies any signs and symptoms of infection.  Pain: Patient reports pain as a 0 out of 10.  SP site is tender but no pain.  Patient reports intermittent bladder spasms.  I will contact Dr. Madera to see if we can prescribe anti-spasmotic.  Narcotics: The patient denies taking any pain medication.  Antibiotics: No    Follow up appointment scheduled on 8/6/18 at 1430 with Dr. Madera.  [facility] at 1030.    Informed the patient of the clinic triage nursing # ([phone]) and urology resident on call # for after hours concerns.    Angelique West, RN  Care Coordinator - Reconstructive Urology

## 2018-07-04 DIAGNOSIS — R30.1 PAINFUL BLADDER SPASM: Primary | ICD-10-CM

## 2018-07-04 RX ORDER — OXYBUTYNIN CHLORIDE 10 MG/1
10 TABLET, EXTENDED RELEASE ORAL DAILY
Qty: 30 TABLET | Refills: 0 | Status: SHIPPED | OUTPATIENT
Start: 2018-07-04 | End: 2018-08-03

## 2018-07-10 ENCOUNTER — TELEPHONE (OUTPATIENT)
Dept: UROLOGY | Facility: CLINIC | Age: 58
End: 2018-07-10

## 2018-07-10 NOTE — TELEPHONE ENCOUNTER
[facility] Call Center    Phone Message    May a detailed message be left on voicemail: yes    Reason for Call: Other: Pt calling to state that he has had a suprapubic catheter in for about a week and a half, has been changing the dressing regularly; however, has noticed some seepage on the gauze. Pt reports no fever, pain or chills. He is requesting a call back to discuss.    Action Taken: Message routed to:  [facility] ([facility]): UC URO AND PROSTATE

## 2018-07-11 NOTE — TELEPHONE ENCOUNTER
Left patient voicemail to call back to discuss.    Angelique West, HELENE  Care Coordinator- Reconstructive Urology

## 2018-07-11 NOTE — TELEPHONE ENCOUNTER
Spoke with patient, he's having half a dime size non-malodorous yellow drainage from SP site, no fever/chills, and no pain.  Explained to patient that this is a normal finding after having his SPT placed on 6/29/18.  Explained to patient if he starts to develop a fever/chills, increased pain, foul smelling drainage or change in color such as green drainage to contact the clinic immediately.  Patient states understanding.    Angelique West RN  Care Coordinator- Reconstructive Urology

## 2018-07-26 ENCOUNTER — TELEPHONE (OUTPATIENT)
Dept: UROLOGY | Facility: CLINIC | Age: 58
End: 2018-07-26

## 2018-07-26 DIAGNOSIS — R82.90 NONSPECIFIC FINDING ON EXAMINATION OF URINE: Primary | ICD-10-CM

## 2018-07-26 DIAGNOSIS — R35.0 URINARY FREQUENCY: ICD-10-CM

## 2018-07-26 DIAGNOSIS — R35.0 URINARY FREQUENCY: Primary | ICD-10-CM

## 2018-07-26 PROCEDURE — 87086 URINE CULTURE/COLONY COUNT: CPT | Performed by: INTERNAL MEDICINE

## 2018-07-26 PROCEDURE — 87186 SC STD MICRODIL/AGAR DIL: CPT | Performed by: INTERNAL MEDICINE

## 2018-07-26 PROCEDURE — 87088 URINE BACTERIA CULTURE: CPT | Performed by: INTERNAL MEDICINE

## 2018-07-26 NOTE — TELEPHONE ENCOUNTER
Spoke with patient.  His urine has been cloudy intermittently, no fever/chills, no hematuria.  Patient needs a UC for his upcoming urethroplasty anyways so patient will leave UC at local [facility] today.    Angelique West RN  Care Coordinator- Reconstructive Urology

## 2018-07-26 NOTE — TELEPHONE ENCOUNTER
Health Call Center    Phone Message    May a detailed message be left on voicemail: yes    Reason for Call: Other: Pt reporting in what he is experiencing and is requesting a call back, please.  Pt is scheduled for a procedure coming up in August, and pt wants to make sure he is doing well and is on track for the procedure. Pt is a little concerned that there may be an infection because the odor of his urine has changed. It is stronger odor.  Pt has no fever, no chills, no pain, but pt still feels like he needs to urinate.  Couple of times recently, pt has emptied, urine has been cloudy, even tho pt has been drinking a ton of water.  Please call pt to assess his condition.     Action Taken: Message routed to:  [facility] URology Clinic

## 2018-07-29 LAB
BACTERIA SPEC CULT: ABNORMAL
SPECIMEN SOURCE: ABNORMAL

## 2018-07-30 DIAGNOSIS — N39.0 URINARY TRACT INFECTION: Primary | ICD-10-CM

## 2018-07-30 RX ORDER — SULFAMETHOXAZOLE/TRIMETHOPRIM 800-160 MG
1 TABLET ORAL 2 TIMES DAILY
Qty: 10 TABLET | Refills: 0 | Status: SHIPPED | OUTPATIENT
Start: 2018-07-30 | End: 2018-08-04

## 2018-08-03 ENCOUNTER — PRE VISIT (OUTPATIENT)
Dept: UROLOGY | Facility: CLINIC | Age: 58
End: 2018-08-03

## 2018-08-03 NOTE — TELEPHONE ENCOUNTER
Reason for visit: post op     Relevant information: supra pubic placement    Records/imaging/labs: all records available    Pt called: No need for a call    Rooming: regular

## 2018-08-06 ENCOUNTER — OFFICE VISIT (OUTPATIENT)
Dept: UROLOGY | Facility: CLINIC | Age: 58
End: 2018-08-06
Payer: COMMERCIAL

## 2018-08-06 ENCOUNTER — RADIANT APPOINTMENT (OUTPATIENT)
Dept: GENERAL RADIOLOGY | Facility: CLINIC | Age: 58
End: 2018-08-06
Attending: UROLOGY
Payer: COMMERCIAL

## 2018-08-06 VITALS
BODY MASS INDEX: 28.63 KG/M2 | DIASTOLIC BLOOD PRESSURE: 80 MMHG | HEIGHT: 70 IN | SYSTOLIC BLOOD PRESSURE: 133 MMHG | HEART RATE: 79 BPM | WEIGHT: 200 LBS

## 2018-08-06 DIAGNOSIS — N39.0 URINARY TRACT INFECTION: ICD-10-CM

## 2018-08-06 DIAGNOSIS — T66.XXXD ADVERSE EFFECT OF RADIATION THERAPY, SUBSEQUENT ENCOUNTER: ICD-10-CM

## 2018-08-06 DIAGNOSIS — N99.111 POSTPROCEDURAL BULBOUS URETHRAL STRICTURE: ICD-10-CM

## 2018-08-06 DIAGNOSIS — N35.919 URETHRAL STRICTURE: ICD-10-CM

## 2018-08-06 ASSESSMENT — PAIN SCALES - GENERAL
PAINLEVEL: NO PAIN (0)
PAINLEVEL: NO PAIN (0)

## 2018-08-06 NOTE — LETTER
8/6/2018       RE: Jimmie Garcia  [address]  [facility] [address]     Dear Colleague,    Thank you for referring your patient, Jimmie Garcia, to the [facility] UROLOGY AND INST FOR PROSTATE AND UROLOGIC CANCERS at [facility]. Please see a copy of my visit note below.    See procedure note from today    Again, thank you for allowing me to participate in the care of your patient.      Sincerely,    Scottie Madera MD

## 2018-08-06 NOTE — NURSING NOTE
"Chief Complaint   Patient presents with     Cystoscopy     Post op - urethral stricture        Blood pressure 133/80, pulse 79, height 1.765 m (5' 9.5\"), weight 90.7 kg (200 lb). Body mass index is 29.11 kg/(m^2).    Patient Active Problem List   Diagnosis     Malignant neoplasm of prostate (H)     Radiation adverse effect, sequela     Postprocedural membranous urethral stricture       Not on File    Current Outpatient Prescriptions   Medication Sig Dispense Refill     Acetaminophen (TYLENOL PO) Take 325 mg by mouth       MULTIPLE VITAMIN PO        omeprazole (PRILOSEC) 20 MG CR capsule Take 20 mg by mouth       oxyCODONE IR (ROXICODONE) 5 MG tablet Take 1 tablet (5 mg) by mouth every 6 hours as needed for other (Moderate to Severe Pain) 8 tablet 0     senna-docusate (SENOKOT-S;PERICOLACE) 8.6-50 MG per tablet Take 1-2 tablets by mouth daily as needed for constipation 20 tablet 0       Social History   Substance Use Topics     Smoking status: Former Smoker     Quit date: 1993     Smokeless tobacco: Never Used     Alcohol use Not on file       ANA M Mcnamara  2018  2:17 PM     Invasive Procedure Safety Checklist:    Procedure:     Action: Complete sections and checkboxes as appropriate.    Pre-procedure:  1. Patient ID Verified with 2 identifiers (Lore and  or MRN) : YES    2. Procedure and site verified with patient/designee (when able) : YES    3. Accurate consent documentation in medical record : YES    4. H&P (or appropriate assessment) documented in medical record : YES  H&P must be up to 30 days prior to procedure an updated within 24 hours of                 Procedure as applicable.     5. Relevant diagnostic and radiology test results appropriately labeled and displayed as applicable : YES    6. Blood products, implants, devices, and/or special equipment available for the procedure as applicable : YES    7. Procedure site(s) marked with provider initials [Exclusions: None] : NO    8. Marking " not required. Reason : Yes  Procedure does not require site marking    Time Out:     Time-Out performed immediately prior to starting procedure, including verbal and active participation of all team members addressing: YES  1. Correct patient identity.  2. Confirmed that the correct side and site are marked.  3. An accurate procedure to be done.  4. Agreement on the procedure to be done.  5. Correct patient position.  6. Relevant images and results are properly labeled and appropriately displayed.  7. The need to administer antibiotics or fluids for irrigation purposes during the procedure as applicable.  8. Safety precautions based on patient history or medication use.    During Procedure: Verification of correct person, site, and procedure occurs any time the responsibility for care of the patient is transferred to another member of the care team.    The following medication was given:     MEDICATION:  Lidocaine jelly 2%  ROUTE: topical  SITE: urethra via the meatus  DOSE: 10 mL  LOT #: [id number]  : IMS, Limited  EXPIRATION DATE: 04/20  NDC#: [address]-5   Was there drug waste? No  Multi-dose vial: No    Raeann Seay CMA  August 6, 2018

## 2018-08-06 NOTE — PROCEDURES
Male Cystoscopy Procedure Note     PRE-PROCEDURE DIAGNOSIS: History of brachytherapy, urethral stenosis   POST-PROCEDURE DIAGNOSIS: Same as above  PROCEDURE: cystoscopy    HISTORY: Jimmie Garcia is a(n) 58 year old male with a history of post-brachytherapy prostatic urethral stenosis who underwent SPT placement on 6/29/18 who presents today for cystoscopy for surgical planning.     REVIEW OF OFFICE STUDIES:            DESCRIPTION OF PROCEDURE:  After informed consent was obtained, the patient was brought to the procedure room where he was placed in the supine position with all pressure points well padded.  The penis, scrotum and lower abdomen including the SPT were prepped and draped in a sterile fashion. A flexible cystoscope was introduced through a well-lubricated urethra.  The anterior urethra was free of stricture. The external urinary sphincter was intact. The proximal membranous urethra showed a ~ 5 [place] Lucian obliterated stricture just past the external sphincter. The cystoscope was then removed. An angled sensor wire was advanced into the bladder via the SPT. The SPT was back loaded off the wire and the cystoscope was advanced into the bladder over the wire. There were bladder mucosal consistent with an indwelling marrero catheter.  No obvious bladder tumor. The prostate appeared healthy up to the verumontanum where the proximal extent of the stricture was encountered. The flexible cystoscope was then back loaded off the wire and a 16 [place] Lucian [place] catheter was advanced into the bladder and 10 ml was placed in the balloon. The findings were described to the patient.     ASSESSMENT AND PLAN:  58 year old man with history of brachytherapy with posterior urethral stenosis involving the external urethral sphincter but not involving the internal urethral sphincter or anything above the verumontanum. Risks, benefits, and alternatives of posterior urethroplasty were described.  He understands the risks to include but  not be limited to bleeding, infection, penile pain or numbness, scrotal pain or numbness, incontinence, lower extremity neuropathy, change in erectile function, change in ejaculatory function, and need for additional procedures. We also discussed gracilis muscle flap for additional coverage of repair site if needed. Because of this we will be in [place].  All questions were answered to patient's understanding. Will plan for posterior urethroplasty scheduled for 8/16/18.   Gloria Odell MD  Urology Resident PGY-4        As the attending surgeon I, Scottie Madera, was present and scrubbed throughout the procedure.

## 2018-08-06 NOTE — LETTER
8/6/2018   RE: Jimmie Garcia  [address]  [place] [facility] [address]     Dear Colleague,    Thank you for referring your patient, Jimmie Garcia, to the [facility] UROLOGY AND INST FOR PROSTATE AND UROLOGIC CANCERS at [facility]. Please see a copy of my visit note below.    See procedure note from today    Again, thank you for allowing me to participate in the care of your patient.      Sincerely,    Scottie Madera MD

## 2018-08-06 NOTE — MR AVS SNAPSHOT
"              After Visit Summary   8/6/2018    Jimmie Garcia    MRN: [id number]           Patient Information     Date Of Birth          1960        Visit Information        Provider Department      8/6/2018 2:30 PM Scottie Madera MD [facility] Urology and Inst for Prostate and Urologic Cancers        Today's Diagnoses     Postprocedural bulbous urethral stricture        Adverse effect of radiation therapy, subsequent encounter          Care Instructions      AFTER YOUR CYSTOSCOPY        You have just completed a cystoscopy, or \"cysto\", which allowed your physician to learn more about your bladder (or to remove a stent placed after surgery). We suggest that you continue to avoid caffeine, fruit juice, and alcohol for the next 24 hours, however, you are encouraged to return to your normal activities.         A few things that are considered normal after your cystoscopy:     * Small amount of bleeding (or spotting) that clears within the next 24 hours     * Slight burning sensation with urination     * Sensation to of needing to avoid more frequently     * The feeling of \"air\" in your urine     * Mild discomfort that is relieved with Tylenol        Please contact our office promptly if you:     * Develop a fever above 101 degrees     * Are unable to urinate     * Develop bright red blood that does not stop     * Severe pain or swelling         Please contact our office with any concerns or questions @[facility].          Follow-ups after your visit        Follow-up notes from your care team     Return in 12 months (on 8/6/2019).      Your next 10 appointments already scheduled     Aug 16, 2018   Procedure with Scottie Madera MD   [facility], [place], Same Day Surgery (--)    [address] [address]   [phone]            Sep 17, 2018  8:00 AM CDT   XR CYSTOGRAM VOIDING with UCXR2, UC GIGU RAD   [facility] Xray ([facility])    [address]  [address]st " "Floor  [place] [facility] [address] [phone]           Please bring a list of your current medicines to your exam. (Include vitamins, minerals and over-thecounter medicines.) Leave your valuables at home.  Tell your doctor if there is a chance you may be pregnant.  You do not need to do anything special for this exam.            Sep 17, 2018  9:00 AM CDT   (Arrive by 8:45 AM)   Post-Op with Scottie Madera MD   [facility] Urology and [facility] for Prostate and Urologic Cancers ([facility] [place])    [address]  4th Floor  [place] [facility] [address] [phone]              Who to contact     Please call your clinic at [phone] to:    Ask questions about your health    Make or cancel appointments    Discuss your medicines    Learn about your test results    Speak to your doctor            Additional Information About Your Visit        [employer] Information     [employer] gives you secure access to your electronic health record. If you see a primary care provider, you can also send messages to your care team and make appointments. If you have questions, please call your primary care clinic.  If you do not have a primary care provider, please call [phone] and they will assist you.      [employer] is an electronic gateway that provides easy, online access to your medical records. With [employer], you can request a clinic appointment, read your test results, renew a prescription or communicate with your care team.     To access your existing account, please contact your [facility] or call [phone] for assistance.        Care EveryWhere ID     This is your Care EveryWhere ID. This could be used by other organizations to access your [facility] medical records  [id number]        Your Vitals Were     Pulse Height BMI (Body Mass Index)             79 1.765 m (5' 9.5\") 29.11 kg/m2          Blood Pressure from Last 3 Encounters:   08/06/18 133/80   06/29/18 123/70 "   05/14/18 197/84    Weight from Last 3 Encounters:   08/06/18 90.7 kg (200 lb)   06/29/18 93 kg (205 lb)   05/14/18 93.9 kg (207 lb)              We Performed the Following     CYSTOURETHROSCOPY        Primary Care Provider Office Phone # Fax #    [facility] [phone]       [address] [facility] [address]        Equal Access to Services     ELLI JERONIMO : Hadii aad ku hadasho Soomaali, waaxda luqadaha, qaybta kaalmada adeegyada, waxay idiin hayaan adeeg awildaaishwaryarussel rehman . So [facility] [phone].    ATENCIÓN: Si habla español, tiene a almazan disposición servicios gratuitos de asistencia lingüística. [facility] [phone].    We comply with applicable federal civil rights laws and [place] laws. We do not discriminate on the basis of race, color, national origin, age, disability, sex, sexual orientation, or gender identity.            Thank you!     Thank you for choosing [facility] UROLOGY AND [facility] FOR PROSTATE AND UROLOGIC CANCERS  for your care. Our goal is always to provide you with excellent care. Hearing back from our patients is one way we can continue to improve our services. Please take a few minutes to complete the written survey that you may receive in the mail after your visit with us. Thank you!             Your Updated Medication List - Protect others around you: Learn how to safely use, store and throw away your medicines at www.disposemymeds.org.          This list is accurate as of 8/6/18  6:10 PM.  Always use your most recent med list.                   Brand Name Dispense Instructions for use Diagnosis    MULTIPLE VITAMIN PO           omeprazole 20 MG CR capsule    priLOSEC     Take 20 mg by mouth        oxyCODONE IR 5 MG tablet    ROXICODONE    8 tablet    Take 1 tablet (5 mg) by mouth every 6 hours as needed for other (Moderate to Severe Pain)    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       senna-docusate 8.6-50 MG per tablet    SENOKOT-S;PERICOLACE     20 tablet    Take 1-2 tablets by mouth daily as needed for constipation    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       TYLENOL PO      Take 325 mg by mouth

## 2018-08-06 NOTE — LETTER
8/6/2018       RE: Jimmie Garcia  [address]  [place] [address]     Dear Colleague,    Thank you for referring your patient, Jimmie Garcia, to the [facility] UROLOGY AND INST FOR PROSTATE AND UROLOGIC CANCERS at [facility]. Please see a copy of my visit note below.    See procedure note from today    Again, thank you for allowing me to participate in the care of your patient.      Sincerely,    Scottie Madera MD

## 2018-08-08 ENCOUNTER — TELEPHONE (OUTPATIENT)
Dept: UROLOGY | Facility: CLINIC | Age: 58
End: 2018-08-08

## 2018-08-08 LAB
BACTERIA SPEC CULT: ABNORMAL
BACTERIA SPEC CULT: ABNORMAL
Lab: ABNORMAL
SPECIMEN SOURCE: ABNORMAL

## 2018-08-08 NOTE — TELEPHONE ENCOUNTER
"[facility] Call Center    Phone Message    May a detailed message be left on voicemail: yes    Reason for Call: Other: Pt called in to talk to Angelique. He said when he was here on Monday, his suprapubic tube was changed. He said since that change, he has had more \"seepage\" out of it than prior to change. Reports no fever, seepage is goopy. Please call to discuss. Thank you.     Action Taken: Message routed to:  [facility] ([facility] Urology    "

## 2018-08-08 NOTE — TELEPHONE ENCOUNTER
"Spoke with patient.  Discharge around SPT is grey in color and \"goopy\", non-malodorous, has no fever/chills and is not red at the site or painful.  Patient has been cleaning around SPT site with hydrogen peroxide, recommended patient clean with warm water and soap instead as this may be the reason for the change in color and consistency in discharge.  Alerted patient if he starts to experience any of the previous listed symptoms to call the clinic right away.  Patient states understanding.    Angelique West RN  Care Coordinator- Reconstructive Urology    "

## 2018-08-13 ENCOUNTER — TELEPHONE (OUTPATIENT)
Dept: UROLOGY | Facility: CLINIC | Age: 58
End: 2018-08-13

## 2018-08-13 NOTE — TELEPHONE ENCOUNTER
[facility] Call Center    Phone Message    May a detailed message be left on voicemail: yes    Reason for Call: Other: Pt calling asking for a call back from Angelique, Pt gave no further info     Action Taken: Message routed to:  [facility] ([facility] jeanie urology

## 2018-08-13 NOTE — TELEPHONE ENCOUNTER
Left patient voicemail to call back to discuss what his questions are about.    Angelique Wets, HELENE  Care Coordinator- Reconstructive Urology

## 2018-08-14 DIAGNOSIS — R30.0 DYSURIA: Primary | ICD-10-CM

## 2018-08-14 RX ORDER — SULFAMETHOXAZOLE/TRIMETHOPRIM 800-160 MG
1 TABLET ORAL 2 TIMES DAILY
Qty: 4 TABLET | Refills: 0 | Status: SHIPPED | OUTPATIENT
Start: 2018-08-14 | End: 2018-08-16

## 2018-08-14 RX ORDER — NITROFURANTOIN 25; 75 MG/1; MG/1
100 CAPSULE ORAL 2 TIMES DAILY
Qty: 4 CAPSULE | Refills: 0 | Status: SHIPPED | OUTPATIENT
Start: 2018-08-14 | End: 2018-08-16

## 2018-08-15 ENCOUNTER — ANESTHESIA EVENT (OUTPATIENT)
Dept: SURGERY | Facility: CLINIC | Age: 58
DRG: 664 | End: 2018-08-15
Payer: COMMERCIAL

## 2018-08-15 NOTE — ANESTHESIA PREPROCEDURE EVALUATION
Anesthesia Evaluation     . Pt has had prior anesthetic. Type: General    No history of anesthetic complications          ROS/MED HX    ENT/Pulmonary:  - neg pulmonary ROS     Neurologic:  - neg neurologic ROS     Cardiovascular:  - neg cardiovascular ROS       METS/Exercise Tolerance:  >4 METS   Hematologic:  - neg hematologic  ROS       Musculoskeletal:  - neg musculoskeletal ROS       GI/Hepatic:  - neg GI/hepatic ROS   (+) GERD (asymptomatic, hasn`t been taking medications for 3 weeks) Other,       Renal/Genitourinary:     (+) Other Renal/ Genitourinary, Postprocedural membranous urethral stricture      Endo:  - neg endo ROS       Psychiatric:  - neg psychiatric ROS       Infectious Disease:  - neg infectious disease ROS       Malignancy:   (+) Malignancy History of Prostate          Other:    (+) C-spine cleared: N/A, no other significant disability                    Physical Exam  Normal systems: cardiovascular, pulmonary and dental    Airway   Mallampati: I  TM distance: >3 FB  Neck ROM: full    Dental     Cardiovascular       Pulmonary                     Anesthesia Plan      History & Physical Review  History and physical reviewed and following examination; no interval change.    ASA Status:  2 .    NPO Status:  > 8 hours    Plan for General and ETT with Intravenous and Propofol induction. Maintenance will be Inhalation.    PONV prophylaxis:  Ondansetron (or other 5HT-3) and Dexamethasone or Solumedrol       Postoperative Care  Postoperative pain management:  IV analgesics and Peripheral nerve block (Single Shot).      Consents  Anesthetic plan, risks, benefits and alternatives discussed with:  Patient.  Use of blood products discussed: Yes.   Use of blood products discussed with Patient.  Consented to blood products.  .        Anesthesia attending addendum    Prior to anesthetic I have examined the patient, reviewed the medical history, and discussed the ssessment and plan with the anesthesia and  surgery teams.  58 year old male who  has a past medical history of Urethral stricture. to OR for Procedure(s):  Posterior Urethroplasty (no buccal graft planned) - Wound Class: II-Clean Contaminated  NPO status adequate.  Plan for GA, standard monitors, large bore IVs, PONV prophylaxis.  Antibiotics per primary service.  Anesthetic risks discussed with the patient, consent in chart.    Kiesha Estrada MD Anesthesiology Attending  08/16/18                    .

## 2018-08-16 ENCOUNTER — ANESTHESIA (OUTPATIENT)
Dept: SURGERY | Facility: CLINIC | Age: 58
DRG: 664 | End: 2018-08-16
Payer: COMMERCIAL

## 2018-08-16 ENCOUNTER — HOSPITAL ENCOUNTER (OUTPATIENT)
Facility: CLINIC | Age: 58
Discharge: HOME | DRG: 664 | End: 2018-08-16
Attending: UROLOGY | Admitting: UROLOGY
Payer: COMMERCIAL

## 2018-08-16 ENCOUNTER — APPOINTMENT (OUTPATIENT)
Dept: GENERAL RADIOLOGY | Facility: CLINIC | Age: 58
DRG: 664 | End: 2018-08-16
Attending: UROLOGY
Payer: COMMERCIAL

## 2018-08-16 VITALS
RESPIRATION RATE: 16 BRPM | OXYGEN SATURATION: 96 % | TEMPERATURE: 97.9 F | SYSTOLIC BLOOD PRESSURE: 111 MMHG | WEIGHT: 200.18 LBS | BODY MASS INDEX: 28.66 KG/M2 | HEART RATE: 66 BPM | DIASTOLIC BLOOD PRESSURE: 75 MMHG | HEIGHT: 70 IN

## 2018-08-16 DIAGNOSIS — N35.014 POST-TRAUMATIC MALE URETHRAL STRICTURE: ICD-10-CM

## 2018-08-16 DIAGNOSIS — T66.XXXS RADIATION ADVERSE EFFECT, SEQUELA: Primary | ICD-10-CM

## 2018-08-16 LAB — GLUCOSE BLDC GLUCOMTR-MCNC: 96 MG/DL (ref 70–99)

## 2018-08-16 PROCEDURE — 25000125 ZZHC RX 250: Performed by: STUDENT IN AN ORGANIZED HEALTH CARE EDUCATION/TRAINING PROGRAM

## 2018-08-16 PROCEDURE — 27210794 ZZH OR GENERAL SUPPLY STERILE: Performed by: UROLOGY

## 2018-08-16 PROCEDURE — 40000170 ZZH STATISTIC PRE-PROCEDURE ASSESSMENT II: Performed by: UROLOGY

## 2018-08-16 PROCEDURE — 71000014 ZZH RECOVERY PHASE 1 LEVEL 2 FIRST HR: Performed by: UROLOGY

## 2018-08-16 PROCEDURE — 25000128 H RX IP 250 OP 636: Performed by: STUDENT IN AN ORGANIZED HEALTH CARE EDUCATION/TRAINING PROGRAM

## 2018-08-16 PROCEDURE — 25000128 H RX IP 250 OP 636: Performed by: UROLOGY

## 2018-08-16 PROCEDURE — 36000053 ZZH SURGERY LEVEL 2 EA 15 ADDTL MIN - UMMC: Performed by: UROLOGY

## 2018-08-16 PROCEDURE — 25000566 ZZH SEVOFLURANE, EA 15 MIN: Performed by: UROLOGY

## 2018-08-16 PROCEDURE — 37000009 ZZH ANESTHESIA TECHNICAL FEE, EACH ADDTL 15 MIN: Performed by: UROLOGY

## 2018-08-16 PROCEDURE — 37000008 ZZH ANESTHESIA TECHNICAL FEE, 1ST 30 MIN: Performed by: UROLOGY

## 2018-08-16 PROCEDURE — 88305 TISSUE EXAM BY PATHOLOGIST: CPT | Performed by: UROLOGY

## 2018-08-16 PROCEDURE — 25000132 ZZH RX MED GY IP 250 OP 250 PS 637: Performed by: STUDENT IN AN ORGANIZED HEALTH CARE EDUCATION/TRAINING PROGRAM

## 2018-08-16 PROCEDURE — 40000986 XR ABDOMEN PORT 1 VW

## 2018-08-16 PROCEDURE — 00000146 ZZHCL STATISTIC GLUCOSE BY METER IP

## 2018-08-16 PROCEDURE — 71000027 ZZH RECOVERY PHASE 2 EACH 15 MINS: Performed by: UROLOGY

## 2018-08-16 PROCEDURE — 36000051 ZZH SURGERY LEVEL 2 1ST 30 MIN - UMMC: Performed by: UROLOGY

## 2018-08-16 PROCEDURE — C9399 UNCLASSIFIED DRUGS OR BIOLOG: HCPCS | Performed by: STUDENT IN AN ORGANIZED HEALTH CARE EDUCATION/TRAINING PROGRAM

## 2018-08-16 PROCEDURE — 71000015 ZZH RECOVERY PHASE 1 LEVEL 2 EA ADDTL HR: Performed by: UROLOGY

## 2018-08-16 PROCEDURE — 25000128 H RX IP 250 OP 636: Performed by: ANESTHESIOLOGY

## 2018-08-16 RX ORDER — METOPROLOL TARTRATE 1 MG/ML
INJECTION, SOLUTION INTRAVENOUS PRN
Status: DISCONTINUED | OUTPATIENT
Start: 2018-08-16 | End: 2018-08-16

## 2018-08-16 RX ORDER — FENTANYL CITRATE 50 UG/ML
25-50 INJECTION, SOLUTION INTRAMUSCULAR; INTRAVENOUS
Status: DISCONTINUED | OUTPATIENT
Start: 2018-08-16 | End: 2018-08-16 | Stop reason: HOSPADM

## 2018-08-16 RX ORDER — ACETAMINOPHEN 325 MG/1
650 TABLET ORAL ONCE
Status: DISCONTINUED | OUTPATIENT
Start: 2018-08-16 | End: 2018-08-16 | Stop reason: HOSPADM

## 2018-08-16 RX ORDER — AMOXICILLIN 250 MG
1-2 CAPSULE ORAL DAILY PRN
Qty: 60 TABLET | Refills: 0 | Status: SHIPPED | OUTPATIENT
Start: 2018-08-16

## 2018-08-16 RX ORDER — IBUPROFEN 200 MG
600 TABLET ORAL ONCE
Status: DISCONTINUED | OUTPATIENT
Start: 2018-08-16 | End: 2018-08-16 | Stop reason: HOSPADM

## 2018-08-16 RX ORDER — SODIUM CHLORIDE, SODIUM LACTATE, POTASSIUM CHLORIDE, CALCIUM CHLORIDE 600; 310; 30; 20 MG/100ML; MG/100ML; MG/100ML; MG/100ML
INJECTION, SOLUTION INTRAVENOUS CONTINUOUS
Status: DISCONTINUED | OUTPATIENT
Start: 2018-08-16 | End: 2018-08-16 | Stop reason: HOSPADM

## 2018-08-16 RX ORDER — VANCOMYCIN HYDROCHLORIDE 1 G/200ML
1000 INJECTION, SOLUTION INTRAVENOUS
Status: COMPLETED | OUTPATIENT
Start: 2018-08-16 | End: 2018-08-16

## 2018-08-16 RX ORDER — ACETAMINOPHEN 325 MG/1
325 TABLET ORAL EVERY 6 HOURS PRN
Qty: 100 TABLET | Refills: 0 | Status: SHIPPED | OUTPATIENT
Start: 2018-08-16

## 2018-08-16 RX ORDER — OXYCODONE HYDROCHLORIDE 5 MG/1
5 TABLET ORAL ONCE
Status: COMPLETED | OUTPATIENT
Start: 2018-08-16 | End: 2018-08-16

## 2018-08-16 RX ORDER — CIPROFLOXACIN 500 MG/1
500 TABLET, FILM COATED ORAL ONCE
Qty: 1 TABLET | Refills: 0 | Status: SHIPPED | OUTPATIENT
Start: 2018-08-16 | End: 2018-08-16

## 2018-08-16 RX ORDER — ONDANSETRON 2 MG/ML
4 INJECTION INTRAMUSCULAR; INTRAVENOUS EVERY 30 MIN PRN
Status: DISCONTINUED | OUTPATIENT
Start: 2018-08-16 | End: 2018-08-16 | Stop reason: HOSPADM

## 2018-08-16 RX ORDER — PROPOFOL 10 MG/ML
INJECTION, EMULSION INTRAVENOUS PRN
Status: DISCONTINUED | OUTPATIENT
Start: 2018-08-16 | End: 2018-08-16

## 2018-08-16 RX ORDER — ONDANSETRON 4 MG/1
4 TABLET, ORALLY DISINTEGRATING ORAL EVERY 30 MIN PRN
Status: DISCONTINUED | OUTPATIENT
Start: 2018-08-16 | End: 2018-08-16 | Stop reason: HOSPADM

## 2018-08-16 RX ORDER — ONDANSETRON 2 MG/ML
INJECTION INTRAMUSCULAR; INTRAVENOUS PRN
Status: DISCONTINUED | OUTPATIENT
Start: 2018-08-16 | End: 2018-08-16

## 2018-08-16 RX ORDER — FENTANYL CITRATE 50 UG/ML
INJECTION, SOLUTION INTRAMUSCULAR; INTRAVENOUS PRN
Status: DISCONTINUED | OUTPATIENT
Start: 2018-08-16 | End: 2018-08-16

## 2018-08-16 RX ORDER — OXYCODONE HYDROCHLORIDE 5 MG/1
5 TABLET ORAL EVERY 6 HOURS PRN
Qty: 30 TABLET | Refills: 0 | Status: SHIPPED | OUTPATIENT
Start: 2018-08-16

## 2018-08-16 RX ORDER — LIDOCAINE HYDROCHLORIDE 20 MG/ML
INJECTION, SOLUTION INFILTRATION; PERINEURAL PRN
Status: DISCONTINUED | OUTPATIENT
Start: 2018-08-16 | End: 2018-08-16

## 2018-08-16 RX ORDER — NALOXONE HYDROCHLORIDE 0.4 MG/ML
.1-.4 INJECTION, SOLUTION INTRAMUSCULAR; INTRAVENOUS; SUBCUTANEOUS
Status: DISCONTINUED | OUTPATIENT
Start: 2018-08-16 | End: 2018-08-16 | Stop reason: HOSPADM

## 2018-08-16 RX ORDER — BACITRACIN ZINC 500 [USP'U]/G
OINTMENT TOPICAL 2 TIMES DAILY
Qty: 425 G | Refills: 0 | Status: SHIPPED | OUTPATIENT
Start: 2018-08-16

## 2018-08-16 RX ORDER — LABETALOL HYDROCHLORIDE 5 MG/ML
10 INJECTION, SOLUTION INTRAVENOUS
Status: DISCONTINUED | OUTPATIENT
Start: 2018-08-16 | End: 2018-08-16 | Stop reason: HOSPADM

## 2018-08-16 RX ORDER — HYDROMORPHONE HYDROCHLORIDE 1 MG/ML
.3-.5 INJECTION, SOLUTION INTRAMUSCULAR; INTRAVENOUS; SUBCUTANEOUS EVERY 5 MIN PRN
Status: DISCONTINUED | OUTPATIENT
Start: 2018-08-16 | End: 2018-08-16 | Stop reason: HOSPADM

## 2018-08-16 RX ORDER — OXYBUTYNIN CHLORIDE 5 MG/1
5 TABLET, EXTENDED RELEASE ORAL DAILY
Qty: 45 TABLET | Refills: 1 | Status: SHIPPED | OUTPATIENT
Start: 2018-08-16

## 2018-08-16 RX ADMIN — GENTAMICIN SULFATE 300 MG: 40 INJECTION, SOLUTION INTRAMUSCULAR; INTRAVENOUS at 07:25

## 2018-08-16 RX ADMIN — ROCURONIUM BROMIDE 20 MG: 10 INJECTION INTRAVENOUS at 10:06

## 2018-08-16 RX ADMIN — SODIUM CHLORIDE, POTASSIUM CHLORIDE, SODIUM LACTATE AND CALCIUM CHLORIDE: 600; 310; 30; 20 INJECTION, SOLUTION INTRAVENOUS at 07:15

## 2018-08-16 RX ADMIN — PROPOFOL 50 MG: 10 INJECTION, EMULSION INTRAVENOUS at 10:39

## 2018-08-16 RX ADMIN — ONDANSETRON 4 MG: 2 INJECTION INTRAMUSCULAR; INTRAVENOUS at 11:16

## 2018-08-16 RX ADMIN — ROCURONIUM BROMIDE 50 MG: 10 INJECTION INTRAVENOUS at 07:40

## 2018-08-16 RX ADMIN — VANCOMYCIN HYDROCHLORIDE 1000 MG: 1 INJECTION, SOLUTION INTRAVENOUS at 06:58

## 2018-08-16 RX ADMIN — FENTANYL CITRATE 100 MCG: 50 INJECTION, SOLUTION INTRAMUSCULAR; INTRAVENOUS at 07:40

## 2018-08-16 RX ADMIN — ROCURONIUM BROMIDE 20 MG: 10 INJECTION INTRAVENOUS at 08:41

## 2018-08-16 RX ADMIN — PROPOFOL 200 MG: 10 INJECTION, EMULSION INTRAVENOUS at 07:40

## 2018-08-16 RX ADMIN — ROCURONIUM BROMIDE 20 MG: 10 INJECTION INTRAVENOUS at 10:37

## 2018-08-16 RX ADMIN — FENTANYL CITRATE 50 MCG: 50 INJECTION, SOLUTION INTRAMUSCULAR; INTRAVENOUS at 09:10

## 2018-08-16 RX ADMIN — LIDOCAINE HYDROCHLORIDE 100 MG: 20 INJECTION, SOLUTION INFILTRATION; PERINEURAL at 07:40

## 2018-08-16 RX ADMIN — FENTANYL CITRATE 50 MCG: 50 INJECTION, SOLUTION INTRAMUSCULAR; INTRAVENOUS at 09:41

## 2018-08-16 RX ADMIN — HYDROMORPHONE HYDROCHLORIDE 0.5 MG: 1 INJECTION, SOLUTION INTRAMUSCULAR; INTRAVENOUS; SUBCUTANEOUS at 11:20

## 2018-08-16 RX ADMIN — MIDAZOLAM 1 MG: 1 INJECTION INTRAMUSCULAR; INTRAVENOUS at 07:27

## 2018-08-16 RX ADMIN — FENTANYL CITRATE 25 MCG: 50 INJECTION, SOLUTION INTRAMUSCULAR; INTRAVENOUS at 12:12

## 2018-08-16 RX ADMIN — ROCURONIUM BROMIDE 20 MG: 10 INJECTION INTRAVENOUS at 08:29

## 2018-08-16 RX ADMIN — FENTANYL CITRATE 50 MCG: 50 INJECTION, SOLUTION INTRAMUSCULAR; INTRAVENOUS at 10:39

## 2018-08-16 RX ADMIN — ROCURONIUM BROMIDE 20 MG: 10 INJECTION INTRAVENOUS at 09:41

## 2018-08-16 RX ADMIN — FENTANYL CITRATE 50 MCG: 50 INJECTION, SOLUTION INTRAMUSCULAR; INTRAVENOUS at 11:32

## 2018-08-16 RX ADMIN — SUGAMMADEX 200 MG: 100 INJECTION, SOLUTION INTRAVENOUS at 11:30

## 2018-08-16 RX ADMIN — OXYCODONE HYDROCHLORIDE 5 MG: 5 TABLET ORAL at 13:42

## 2018-08-16 RX ADMIN — METOPROLOL TARTRATE 5 MG: 5 INJECTION INTRAVENOUS at 07:52

## 2018-08-16 RX ADMIN — PROPOFOL 100 MG: 10 INJECTION, EMULSION INTRAVENOUS at 11:31

## 2018-08-16 NOTE — OP NOTE
PREOPERATIVE DIAGNOSIS: Membranous urethral stricture (1 cm).     POSTOPERATIVE DIAGNOSIS: Membranous urethral stricture (2 cm).     PROCEDURES:   1. Cystoscopy.  2. Complex single stage posterior urethral reconstruction using excision and primary anastomosis, made more difficult due to prior radiation    SURGEON: Scottie Madera MD, MS  FELLOW: Alli Adkins MD  RESIDENT: Gloria Odell MD    INDICATIONS: Jimmie Garcia is a 58 year old gentleman who has a membranous urethral stricture after brachytherapy. The risks, benefits and alternatives of urethroplasty were discussed. He wished to proceed. He understands the risks to include but not be limited to bleeding, infection, penile pain or numbness, scrotal pain or numbness, erectile dysfunction, urinary incontinence, disease recurrence, need for additional procedures and lower extremity neuropathy.     DESCRIPTION OF PROCEDURE: After informed consent was obtained and preoperative antibiotics were given, the patient was taken to the operating room and placed supine on the operating table. General anesthesia was induced and he was intubated.     He was placed in the high lithotomy position with all pressure points well padded. The perineum was shaved, prepped and draped in the usual sterile fashion. A 20-Cymraes red rubber catheter was inserted through a well-lubricated urethra up to the site of obstruction.  A vertical midline incision was made in the perineum and carried down through Colle ' s fascia. The bulbospongiosus muscles were split in the midline and reflected off the bulbar urethra.   The urethra was circumferentially dissected off the underlying corporal bodies and dissected proximally and distally until it was free from the corporal bodies. We carried our dissection proximally dividing the perineal body and dissecting the proximal corpus spongiosum off of the pelvic floor. The bulbar arteries were spared.     The urethra was transected at the tip of the red  "rubber catheter at the insertion into the pelvic floor. The pelvic floot scar tissue was split in the midline with a 15 blade scalpel. Holding sutures of 4-0 Vicryl were placed in the proximal and distal urethral ends. This was made more difficult from the prior radiation. Several brachytherapy seeds were found in the urethra. A 15 blade scalpel on a long handle was used to excise scarred urethra, scarred prostate and surrounding scar tissue in the pelvic floor. This was repeated until the 4-0 Vicryl sutures were placed through healthy proximal urethra that calibrated at 28F.    We resected a total of 2 cm of stricture.     The distal urethra was mobilized off the underlying corporal bodies up to the penoscrotal junction until the two could reach without tension. Bleeding points were controlled with bipolar cautery. The distal urethra was spatulated dorsally and calibrated at 28F.    A single layer anastomosis was performed with 5-0 PDS suture. This was done in a parachute fashion by pre-placing 12 clockwise sutures through the proximal stump and tagging these with numbered rubber-shod mosquito clamps. These same sutures were then passed through the distal urethra circumferentially. The 16F silastic catheter was passed through the penile urethra and into the bladder. The sutures were then tied down circumferentially.   The bulbospongiosus muscles were closed with a running 3-0 Vicryl. Colle's fascia was closed with running 3-0 Vicryl. The skin was closed with interrupted 4-0 Monocryl. The patient was awakened from anesthesia, transferred to [facility] and then to the postanesthesia care unit in stable condition.     Complications: None  EBL 50cc.    5' 10\"  200 lbs 2.84 oz  Body mass index is 28.72 kg/(m^2).          "

## 2018-08-16 NOTE — IP AVS SNAPSHOT
Same Day Surgery [address] [address]    Phone:  [phone]                                       After Visit Summary   8/16/2018    Jimmie Garcia    MRN: [id number]           After Visit Summary Signature Page     I have received my discharge instructions, and my questions have been answered. I have discussed any challenges I see with this plan with the nurse or doctor.    ..........................................................................................................................................  Patient/Patient Representative Signature      ..........................................................................................................................................  Patient Representative Print Name and Relationship to Patient    ..................................................               ................................................  Date                                            Time    ..........................................................................................................................................  Reviewed by Signature/Title    ...................................................              ..............................................  Date                                                            Time

## 2018-08-16 NOTE — PROGRESS NOTES
SPIRITUAL HEALTH SERVICES  [facility] 3C   PRE-SURGERY VISIT    Had pre-surgery visit with pt.  Provided spiritual support, prayer.     Al[facility]  Oncology   Pager [phone]

## 2018-08-16 NOTE — ANESTHESIA POSTPROCEDURE EVALUATION
Patient: Jimmie DANGELO [facility]    Procedure(s):  Posterior Urethroplasty - Wound Class: II-Clean Contaminated    Diagnosis:Membranous Urethral Stricture  Diagnosis Additional Information: No value filed.    Anesthesia Type:  General, ETT    Note:  Anesthesia Post Evaluation    Patient location during evaluation: Bedside  Patient participation: Able to fully participate in evaluation  Level of consciousness: awake and alert  Pain management: adequate  Cardiovascular status: hemodynamically stable  Respiratory status: acceptable  Hydration status: acceptable  PONV: none     Anesthetic complications: None          Last vitals:  Vitals:    08/16/18 1328 08/16/18 1330 08/16/18 1345   BP: 113/69 113/69 120/74   Pulse:      Resp: 16 16   Temp: 36.6  C (97.9  F)     SpO2: 98% 97% 98%         Electronically Signed By: Kiesha Estrada MD  August 16, 2018  2:15 PM

## 2018-08-16 NOTE — IP AVS SNAPSHOT
MRN:[id number]                      After Visit Summary   8/16/2018    Jimmie Garcia    MRN: [id number]           Thank you!     Thank you for choosing [facility] for your care. Our goal is always to provide you with excellent care. Hearing back from our patients is one way we can continue to improve our services. Please take a few minutes to complete the written survey that you may receive in the mail after you visit with us. Thank you!        Patient Information     Date Of Birth          1960        About your hospital stay     You were admitted on:  August 16, 2018 You last received care in the:  Same Day [facility]    You were discharged on:  August 16, 2018       Who to Call     For medical emergencies, please call 911.  For non-urgent questions about your medical care, please call your primary care provider or clinic, [phone]  For questions related to your surgery, please call your surgery clinic        Attending Provider     Provider Specialty    Scottie Madera MD Urology       Primary Care Provider Office Phone # Fax #    Adrian [facility] [phone]      After Care Instructions     Discharge Instructions       Procedure:  Urethroplasty     Pain:     For pain you are being prescribed oxycodone, a narcotic pain medication which should only be required for the first three to five days.  Narcotics will cause sleepiness and constipation, therefore it is best to stop or reduce them as soon as you can and switch to using acetaminophen (Tylenol) and/or ibuprofen (Advil/Motrin), taken as directed on the packaging.  Do not take more than 4,000mg of Tylenol (acetaminophen) from all sources in any 24 hour period since this can cause liver damage.      You may notice bladder spasms which can present as sharp sudden pains in the area of bladder (just above the pubic hair) or the tip of the penis.  This is usually because the catheters irritate the bladder.  You may feel like you have to urinate even though the bladder is fully drained. You will be given a prescription (Detrol / tolterodine) of a medication that can help with these.  It is important to take the bladder spasm medicine because occasionally these bladder spasms can get intense enough to cause you to urinate around (rather than through) the catheter, and this rush of urine can damage the urethroplasty work.    Urination:  You will go home with the suprapubic tube and Mancilla catheter in your penis to drain your bladder until your follow-up appointment in six weeks.  This should be secured at all times to avoid tugging /trauma, especially to the penis.  This catheter will not generally restrict your activities, but you should be careful if you are driving such that they do not become a distraction.  You may apply the bacitracin antibiotic ointment (see below) to the tip of the penis 2-3 times/a day to prevent irritation.  You may notice a little blood, small amount of white discharge, or caking at the tip of the penis as well as around the suprapubic catheter. This is normal but it can irritate the catheter insertion sites, so it is best to wash this off in the shower.  It is fine to get the urethral catheter and drain bags wet.     Activity:   - No strenuous exercise for 6 weeks but walking and stairs are fine. You may gradually return to normal activity and normal lifting over the course of 6 weeks but take care not to strain your incisions.  Use good judgment: if something hurts then don't do it.  - You should not soak in a tub or pool until the catheter is removed but daily showering is important for healing.  - Do not strain with bowel movements.    - Do not drive until you can press the brake pedal quickly and fully without pain.   - Do not operate a motor vehicle while taking narcotic pain medications.     Diet:  You may return to your normal diet that you were taking before surgery.     Wound Care:       -- Perineu,-- the stitches will dissolve in about 3 weeks. Leave incision open to air, you may find wearing a small pad in your underwear can help protect from blood staining your underwear. It is recommended that you wear your scrotal support for the first week following surgery to help reduce swelling (please wash scrotal support if it becomes soiled).   You can shower and let the water run over your incisions but you should not scrub them with soap.    Medications:  1) Oxycodone- this is a narcotic pain medication which you should only need for three to five days after surgery.    2) Ditropan - This is a bladder spasm medicine which you should take three times daily  3) Bacitracin Ointment - This is an antibiotic ointment which will help with discomfort from the marrero cathteter  4) Ciprofloxacin  - This is an antibiotic pill to take the morning of your Marrero catheter removal appointment  6) Pericolace (Senna/docusate) - This is a stool softener to take twice daily.  The surgery, pain medications, and bladder spasm medications can lead to constipation.  You can stop or reduce the pericolace if you are having loose stools.  Other over the counter solutions such as prune juice, miralax, fiber products, senna, and dulcolax can also be used.  If you have not had a bowel movement in 3 days start over-the-counter Milk of Magnesia taken twice daily as directed until you have a good result.     Follow-up:  Please call Dr. Madera's nurse- Angelique on the Monday after your surgery at [phone] to update him on your progress and so he can answer any questions you have.   Your follow-up appointment in Dr. Madera's clinic is in six weeks  the outpatient Urology Clinic. This appointment may be with our Physician's Assistant, Tiffany Leon or with Dr. Madera's fellow rather than Dr. Madera. They are part of his team and very experienced in the post-operative care of his patients.       You will have an X-ray and urethral  catheter removal done immediately before the clinic visit: Please take your Ciprofloxacin tablet prior to these appointments.   Call or return sooner than your regularly scheduled visit if you develop any of the following:  Fever (greater than 101.3F) or flu-like symptoms, uncontrolled pain, uncontrolled nausea or vomiting, as well as increased redness, swelling, or drainage from your wound.  Call or return sooner if you notice bladder or kidney pain, difficulty with your catheter drainage or problem with your drains (redness, pain or increasing drain output (rather than decreasing output)).     Questions:  If you have any questions here are some phone numbers and emails you can use to reach us:  - Dr. Madera's nurse-Angelique [phone]  - Nursing phone helpline at the Urology Clinic (8A-5P M-F): [phone] and choose option 3  - Nights or weekends, call [phone] and ask the  to page the urology resident on call.   - For emergencies, always call 911                  Your next 10 appointments already scheduled     Sep 17, 2018  8:00 AM CDT   XR CYSTOGRAM VOIDING with UCXR2, UC GIGU RAD   [facility] ([facility])    [address]  [address] [facility] [place] [address] [phone]           Please bring a list of your current medicines to your exam. (Include vitamins, minerals and over-thecounter medicines.) Leave your valuables at home.  Tell your doctor if there is a chance you may be pregnant.  You do not need to do anything special for this exam.            Sep 17, 2018  9:00 AM CDT   (Arrive by 8:45 AM)   Post-Op with Scottie Madera MD   [facility] ([facility])    [address]  4th Floor  [facility] [address] [phone]              Further instructions from your care team       [facility]  Same-Day Surgery   Adult  Discharge Orders & Instructions     For 24 hours after surgery    1. Get plenty of rest.  A responsible adult must stay with you for at least 24 hours after you leave the hospital.   2. Do not drive or use heavy equipment.  If you have weakness or tingling, don't drive or use heavy equipment until this feeling goes away.  3. Do not drink alcohol.  4. Avoid strenuous or risky activities.  Ask for help when climbing stairs.   5. You may feel lightheaded.  IF so, sit for a few minutes before standing.  Have someone help you get up.   6. If you have nausea (feel sick to your stomach): Drink only clear liquids such as apple juice, ginger ale, broth or 7-Up.  Rest may also help.  Be sure to drink enough fluids.  Move to a regular diet as you feel able.  7. You may have a slight fever. Call the doctor if your fever is over 100.5 F (37.7 C) (taken under the tongue) or lasts longer than 24 hours.  8. You may have a dry mouth, a sore throat, muscle aches or trouble sleeping.  These should go away after 24 hours.  9. Do not make important or legal decisions.   Call your doctor for any of the followin.  Signs of infection (fever, growing tenderness at the surgery site, a large amount of drainage or bleeding, severe pain, foul-smelling drainage, redness, swelling).    2. It has been over 8 to 10 hours since surgery and you are still not able to urinate (pass water).    3.  Headache for over 24 hours.      To contact a doctor, call Dr Hill's office at [phone] (clinic)  or:        [phone] and ask for the resident on call for Urology (answered 24 hours a day)      Emergency Department:    [facility]: [phone]       (TTY for hearing impaired: [phone])          Pending Results     Date and Time Order Name Status Description    2018 0910 Surgical pathology exam In process             Admission Information     Date & Time Provider Department Dept. Phone    2018 Scottie Madera MD Same  "Day Surgery [facility] [phone]      Your Vitals Were     Blood Pressure Pulse Temperature Respirations Height Weight    120/74 66 97.9  F (36.6  C) (Oral) 16 1.778 m (5' 10\") 90.8 kg (200 lb 2.8 oz)    Pulse Oximetry BMI (Body Mass Index)                98% 28.72 kg/m2          [facility] Information     [facility] gives you secure access to your electronic health record. If you see a primary care provider, you can also send messages to your care team and make appointments. If you have questions, please call your primary care clinic.  If you do not have a primary care provider, please call [phone] and they will assist you.        Care EveryWhere ID     This is your Care EveryWhere ID. This could be used by other organizations to access your [facility] medical records  [id number]        Equal Access to Services     ELLI JERONIMO : Jarrett Jimenez, duane figueredo, temi alvarenga, tevin rehman . So [facility] [phone].    ATENCIÓN: Si habla español, tiene a almazan disposición servicios gratuitos de asistencia lingüística. Llame al [phone].    We comply with applicable federal civil rights laws and [place] laws. We do not discriminate on the basis of race, color, national origin, age, disability, sex, sexual orientation, or gender identity.               Review of your medicines      START taking        Dose / Directions    bacitracin ointment   Commonly known as:  [employer] BACITRACIN ZINC   Used for:  Radiation adverse effect, sequela        Apply topically 2 times daily   Quantity:  425 g   Refills:  0       ciprofloxacin 500 MG tablet   Commonly known as:  CIPRO   Used for:  Radiation adverse effect, sequela        Dose:  500 mg   Take 1 tablet (500 mg) by mouth once for 1 dose   Quantity:  1 tablet   Refills:  0       oxybutynin 5 MG 24 hr tablet   Commonly known as:  DITROPAN XL   Used for:  Radiation adverse effect, sequela        Dose:  5 mg   Take 1 tablet " (5 mg) by mouth daily   Quantity:  45 tablet   Refills:  1         CONTINUE these medicines which may have CHANGED, or have new prescriptions. If we are uncertain of the size of tablets/capsules you have at home, strength may be listed as something that might have changed.        Dose / Directions    acetaminophen 325 MG tablet   Commonly known as:  TYLENOL   This may have changed:    - medication strength  - when to take this  - reasons to take this   Used for:  Radiation adverse effect, sequela        Dose:  325 mg   Take 1 tablet (325 mg) by mouth every 6 hours as needed   Quantity:  100 tablet   Refills:  0         CONTINUE these medicines which have NOT CHANGED        Dose / Directions    MULTIPLE VITAMIN PO        Dose:  1 tablet   1 tablet daily   Refills:  0       nitroFURantoin (macrocrystal-monohydrate) 100 MG capsule   Commonly known as:  MACROBID   Used for:  Dysuria        Dose:  100 mg   Take 1 capsule (100 mg) by mouth 2 times daily for 2 days   Quantity:  4 capsule   Refills:  0       omeprazole 20 MG CR capsule   Commonly known as:  priLOSEC        Dose:  20 mg   Take 20 mg by mouth   Refills:  0       oxyCODONE IR 5 MG tablet   Commonly known as:  ROXICODONE   Used for:  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        Dose:  5 mg   Take 1 tablet (5 mg) by mouth every 6 hours as needed for other (Moderate to Severe Pain)   Quantity:  30 tablet   Refills:  0       senna-docusate 8.6-50 MG per tablet   Commonly known as:  SENOKOT-S;PERICOLACE   Used for:  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        Dose:  1-2 tablet   Take 1-2 tablets by mouth daily as needed for constipation   Quantity:  60 tablet   Refills:  0       sulfamethoxazole-trimethoprim 800-160 MG per tablet   Commonly known as:  BACTRIM DS/SEPTRA DS   Used for:  Dysuria        Dose:  1 tablet   Take 1 tablet by mouth 2 times daily for 2 days   Quantity:  4 tablet   Refills:  0            Where to get your medicines      These medications  were sent to [facility] - [place], [place] - [address]  [address] [place] [address]     Phone:  [phone]     acetaminophen 325 MG tablet    bacitracin ointment    ciprofloxacin 500 MG tablet    oxybutynin 5 MG 24 hr tablet    senna-docusate 8.6-50 MG per tablet         Some of these will need a paper prescription and others can be bought over the counter. Ask your nurse if you have questions.     Bring a paper prescription for each of these medications     oxyCODONE IR 5 MG tablet                Protect others around you: Learn how to safely use, store and throw away your medicines at www.disposemymeds.org.        ANTIBIOTIC INSTRUCTION     You've Been Prescribed an Antibiotic - Now What?  Your healthcare team thinks that you or your loved one might have an infection. Some infections can be treated with antibiotics, which are powerful, life-saving drugs. Like all medications, antibiotics have side effects and should only be used when necessary. There are some important things you should know about your antibiotic treatment.      Your healthcare team may run tests before you start taking an antibiotic.    Your team may take samples (e.g., from your blood, urine or other areas) to run tests to look for bacteria. These test can be important to determine if you need an antibiotic at all and, if you do, which antibiotic will work best.      Within a few days, your healthcare team might change or even stop your antibiotic.    Your team may start you on an antibiotic while they are working to find out what is making you sick.    Your team might change your antibiotic because test results show that a different antibiotic would be better to treat your infection.    In some cases, once your team has more information, they learn that you do not need an antibiotic at all. They may find out that you don't have an infection, or that the antibiotic you're taking won't work against your  infection. For example, an infection caused by a virus can't be treated with antibiotics. Staying on an antibiotic when you don't need it is more likely to be harmful than helpful.      You may experience side effects from your antibiotic.    Like all medications, antibiotics have side effects. Some of these can be serious.    Let you healthcare team know if you have any known allergies when you are admitted to the hospital.    One significant side effect of nearly all antibiotics is the risk of severe and sometimes deadly diarrhea caused by Clostridium difficile (C. Difficile). This occurs when a person takes antibiotics because some good germs are destroyed. Antibiotic use allows C. diificile to take over, putting patients at high risk for this serious infection.    As a patient or caregiver, it is important to understand your or your loved one's antibiotic treatment. It is especially important for caregivers to speak up when patients can't speak for themselves. Here are some important questions to ask your healthcare team.    What infection is this antibiotic treating and how do you know I have that infection?    What side effects might occur from this antibiotic?    How long will I need to take this antibiotic?    Is it safe to take this antibiotic with other medications or supplements (e.g., vitamins) that I am taking?     Are there any special directions I need to know about taking this antibiotic? For example, should I take it with food?    How will I be monitored to know whether my infection is responding to the antibiotic?    What tests may help to make sure the right antibiotic is prescribed for me?      Information provided by:  www.cdc.gov/getsmart  U.S. Department of Health and Human Services  Centers for disease Control and Prevention  National Center for Emerging and Zoonotic Infectious Diseases  Division of Healthcare Quality Promotion        Information about OPIOIDS     PRESCRIPTION OPIOIDS: WHAT  YOU NEED TO KNOW   We gave you an opioid (narcotic) pain medicine. It is important to manage your pain, but opioids are not always the best choice. You should first try all the other options your care team gave you. Take this medicine for as short a time (and as few doses) as possible.    Some activities can increase your pain, such as bandage changes or therapy sessions. It may help to take your pain medicine 30 to 60 minutes before these activities. Reduce your stress by getting enough sleep, working on hobbies you enjoy and practicing relaxation or meditation. Talk to your care team about ways to manage your pain beyond prescription opioids.    These medicines have risks:    DO NOT drive when on new or higher doses of pain medicine. These medicines can affect your alertness and reaction times, and you could be arrested for driving under the influence (DUI). If you need to use opioids long-term, talk to your care team about driving.    DO NOT operate heavy machinery    DO NOT do any other dangerous activities while taking these medicines.    DO NOT drink any alcohol while taking these medicines.     If the opioid prescribed includes acetaminophen, DO NOT take with any other medicines that contain acetaminophen. Read all labels carefully. Look for the word  acetaminophen  or  Tylenol.  Ask your pharmacist if you have questions or are unsure.    You can get addicted to pain medicines, especially if you have a history of addiction (chemical, alcohol or substance dependence). Talk to your care team about ways to reduce this risk.    All opioids tend to cause constipation. Drink plenty of water and eat foods that have a lot of fiber, such as fruits, vegetables, prune juice, apple juice and high-fiber cereal. Take a laxative (Miralax, milk of magnesia, Colace, Senna) if you don t move your bowels at least every other day. Other side effects include upset stomach, sleepiness, dizziness, throwing up, tolerance (needing  more of the medicine to have the same effect), physical dependence and slowed breathing.    Store your pills in a secure place, locked if possible. We will not replace any lost or stolen medicine. If you don t finish your medicine, please throw away (dispose) as directed by your pharmacist. The [place] Pollution Control Agency has more information about safe disposal: https://www.pca.formerly [facility].[place].us/living-green/managing-unwanted-medications             Medication List: This is a list of all your medications and when to take them. Check marks below indicate your daily home schedule. Keep this list as a reference.      Medications           Morning Afternoon Evening Bedtime As Needed    acetaminophen 325 MG tablet   Commonly known as:  TYLENOL   Take 1 tablet (325 mg) by mouth every 6 hours as needed                                bacitracin ointment   Commonly known as:  [employer]S BACITRACIN ZINC   Apply topically 2 times daily                                ciprofloxacin 500 MG tablet   Commonly known as:  CIPRO   Take 1 tablet (500 mg) by mouth once for 1 dose                                MULTIPLE VITAMIN PO   1 tablet daily                                nitroFURantoin (macrocrystal-monohydrate) 100 MG capsule   Commonly known as:  MACROBID   Take 1 capsule (100 mg) by mouth 2 times daily for 2 days                                omeprazole 20 MG CR capsule   Commonly known as:  priLOSEC   Take 20 mg by mouth                                oxybutynin 5 MG 24 hr tablet   Commonly known as:  DITROPAN XL   Take 1 tablet (5 mg) by mouth daily                                oxyCODONE IR 5 MG tablet   Commonly known as:  ROXICODONE   Take 1 tablet (5 mg) by mouth every 6 hours as needed for other (Moderate to Severe Pain)   Last time this was given:  5 mg on 8/16/2018  1:42 PM                                senna-docusate 8.6-50 MG per tablet   Commonly known as:  SENOKOT-S;PERICOLACE   Take 1-2 tablets by mouth daily as  needed for constipation                                sulfamethoxazole-trimethoprim 800-160 MG per tablet   Commonly known as:  BACTRIM DS/SEPTRA DS   Take 1 tablet by mouth 2 times daily for 2 days                                          More Information        Discharge Instructions: Caring for Your Indwelling Urinary Catheter  You have been discharged with an indwelling urinary catheter (also called a Mancilla catheter). A catheter is a thin, flexible tube. An indwelling urinary catheter has two parts. The first part is a tube that drains urine from your bladder. The second part is a bag or other device that collects the urine.  The most important thing to remember is that you want to prevent infection. Always wash your hands before handling your catheter bag or tubing.  Draining the bedside bag    Wash your hands with soap and clean, running water or use an alcohol-based hand  that contains at least 60% alcohol.    Hold the drainage tube over a toilet or measuring container.    Unclamp the tube and let the bag drain.    Don t touch the tip of the drainage tube or let it touch the toilet or container.  Cleaning the drainage tube    When the bag is empty, clean the tip of the drainage tube with an alcohol wipe.    Clamp the tube.    Reinsert the tube into the pocket on the drainage bag.  Cleaning your skin and tubing    Clean the skin near the catheter with soap and water.    Wash your genital area from front to back.    Wash the catheter tubing. Always wash the catheter in the direction away from your body.    You will be told when and how to change your bag and tubing.    Don t try to remove the catheter by yourself.    You may shower with the catheter in place.  Emptying a leg bag    Wash your hands.    Remove the stopper on the bag.    Drain the bag into the toilet or a measuring container. Don t let the tip of the drainage tube touch anything, including your fingers.    Clean the tip of the drainage  tube with alcohol.    Replace the stopper.  Follow-up care  Make a follow-up appointment as directed by your healthcare provider  When to call your healthcare provider  Call your healthcare provider right away if you have any of the following:    Chills or fever above 100.4 F (38 C)    Leakage around the catheter insertion site    Increased spasms (uncontrollable twitching) in your legs, abdomen, or bladder. Occasional mild spasms are normal.    Burning in the urinary tract, penis, or genital area    Nausea and vomiting    Aching in the lower back    Cloudy or bloody (pink or red) urine, sediment or mucus in the urine, or foul-smelling urine   Date Last Reviewed: 1/1/2017 2000-2017 [employer] [address] [place] [address], [place] [address]. All rights reserved. This information is not intended as a substitute for professional medical care. Always follow your healthcare professional's instructions.

## 2018-08-16 NOTE — ANESTHESIA CARE TRANSFER NOTE
Patient: Jimmie Garcia    Procedure(s):  Posterior Urethroplasty - Wound Class: II-Clean Contaminated    Diagnosis: Membranous Urethral Stricture  Diagnosis Additional Information: No value filed.    Anesthesia Type:   General, ETT     Note:  Airway :Face Mask  Patient transferred to:PACU  Comments: Tolerated anesthesia well. AAO in PAR without c/oHandoff Report: Identifed the Patient, Identified the Reponsible Provider, Reviewed the pertinent medical history, Discussed the surgical course, Reviewed Intra-OP anesthesia mangement and issues during anesthesia, Set expectations for post-procedure period and Allowed opportunity for questions and acknowledgement of understanding      Vitals: (Last set prior to Anesthesia Care Transfer)    CRNA VITALS  8/16/2018 1111 - 8/16/2018 1153      8/16/2018             Pulse: 94    SpO2: 100 %    Resp Rate (observed): (!)  1                Electronically Signed By: ÁLVARO Bennett CRNA  August 16, 2018  11:53 AM

## 2018-08-16 NOTE — DISCHARGE INSTRUCTIONS
[facility]  Same-Day Surgery   Adult Discharge Orders & Instructions     For 24 hours after surgery    1. Get plenty of rest.  A responsible adult must stay with you for at least 24 hours after you leave the hospital.   2. Do not drive or use heavy equipment.  If you have weakness or tingling, don't drive or use heavy equipment until this feeling goes away.  3. Do not drink alcohol.  4. Avoid strenuous or risky activities.  Ask for help when climbing stairs.   5. You may feel lightheaded.  IF so, sit for a few minutes before standing.  Have someone help you get up.   6. If you have nausea (feel sick to your stomach): Drink only clear liquids such as apple juice, ginger ale, broth or 7-Up.  Rest may also help.  Be sure to drink enough fluids.  Move to a regular diet as you feel able.  7. You may have a slight fever. Call the doctor if your fever is over 100.5 F (37.7 C) (taken under the tongue) or lasts longer than 24 hours.  8. You may have a dry mouth, a sore throat, muscle aches or trouble sleeping.  These should go away after 24 hours.  9. Do not make important or legal decisions.   Call your doctor for any of the followin.  Signs of infection (fever, growing tenderness at the surgery site, a large amount of drainage or bleeding, severe pain, foul-smelling drainage, redness, swelling).    2. It has been over 8 to 10 hours since surgery and you are still not able to urinate (pass water).    3.  Headache for over 24 hours.      To contact a doctor, call Dr Hill's office at [phone] (clinic)  or:        [phone] and ask for the resident on call for Urology (answered 24 hours a day)      Emergency Department:    [facility]: [phone]       (TTY for hearing impaired: [phone])

## 2018-08-17 ENCOUNTER — CARE COORDINATION (OUTPATIENT)
Dept: UROLOGY | Facility: CLINIC | Age: 58
End: 2018-08-17

## 2018-08-17 ENCOUNTER — TELEPHONE (OUTPATIENT)
Dept: UROLOGY | Facility: CLINIC | Age: 58
End: 2018-08-17

## 2018-08-17 LAB — COPATH REPORT: NORMAL

## 2018-08-17 NOTE — H&P
Urology Admission History and Physical    Name: Jimmie Garcia    MRN: [id number]   YOB: 1960               Chief Complaint:   Urethral stricture    History is obtained from the patient          History of Present Illness:   Jimmie Garcia is a(n) 58 year old male with a history of post-brachytherapy prostatic urethral stenosis who underwent SPT placement on 6/29/18 who recently underwent cystoscopy in clinic and presents today for urethroplasty.           Past Medical History:     Past Medical History:   Diagnosis Date     Urethral stricture             Past Surgical History:     Past Surgical History:   Procedure Laterality Date     CYSTOSCOPY FLEXIBLE, CYSTOSTOMY, INSERT TUBE SUPRAPUBIC, COMBINED N/A 6/29/2018    Procedure: COMBINED CYSTOSCOPY FLEXIBLE, CYSTOSTOMY, INSERT TUBE SUPRAPUBIC;  Cystoscopy and Insertion of Suprapubic Cystostomy Tube, cystogram;  Surgeon: Scottie Madera MD;  Location: [facility] OR     URETHROPLASTY N/A 8/16/2018    Procedure: URETHROPLASTY;  Posterior Urethroplasty;  Surgeon: Scottie Madera MD;  Location: [facility] OR            Social History:     Social History   Substance Use Topics     Smoking status: Former Smoker     Quit date: 04/1993     Smokeless tobacco: Never Used     Alcohol use Yes      Comment: a couple of beers per week            Family History:   History reviewed. No pertinent family history.         Allergies:   No Known Allergies         Medications:     No current facility-administered medications for this encounter.      Current Outpatient Prescriptions   Medication Sig     acetaminophen (TYLENOL) 325 MG tablet Take 1 tablet (325 mg) by mouth every 6 hours as needed     bacitracin ([employer] BACITRACIN ZINC) ointment Apply topically 2 times daily     MULTIPLE VITAMIN PO 1 tablet daily      oxybutynin (DITROPAN XL) 5 MG 24 hr tablet Take 1 tablet (5 mg) by mouth daily     oxyCODONE IR (ROXICODONE) 5 MG tablet Take 1 tablet (5 mg) by mouth every 6 hours as  needed for other (Moderate to Severe Pain)     senna-docusate (SENOKOT-S;PERICOLACE) 8.6-50 MG per tablet Take 1-2 tablets by mouth daily as needed for constipation     omeprazole (PRILOSEC) 20 MG CR capsule Take 20 mg by mouth     Facility-Administered Medications Ordered in Other Encounters   Medication     iopamidol (ISOVUE-250) 51% solution 100 mL     iopamidol (ISOVUE-250) 51% solution 150 mL             Review of Systems:    ROS: 10 point ROS neg other than the symptoms noted above in the HPI           Physical Exam:   VS:  T: 97.9    HR: 66    BP: 111/75    RR: 16   GEN:  AOx3.  NAD.    CV:  RRR  LUNGS: Non-labored breathing.   BACK:  No midline or CVA tenderness.  ABD:  Soft.  NT.  ND.  No rebound or guarding.  No masses.  :  circumcised.  Normal penile shaft.  Testicles descended bilaterally, no nodules or tenderness.  No inguinal hernias.  DERIK:  Deferred  EXT:  Warm, well perfused.  no edema.  SKIN:  Warm.  Dry.  No rashes.  NEURO:  CN grossly intact.                                        Impression and Plan:   Impression:   Jimmie Garcia is a 58 year old male with urethral stricture s/p brachytherapy      Plan:    To OR today for urethroplasty  Risks, benefits, alternatives discussed with patient, informed consent obtained

## 2018-08-17 NOTE — TELEPHONE ENCOUNTER
----- Message from Angelique West RN sent at 8/16/2018 12:02 PM CDT -----  Regarding: FW: Follow Up  Hello,    Please reschedule patient to 2 weeks later then originally scheduled appt along with [facility].    ThanksAngelique  ----- Message -----     From: Alli Adkins MD     Sent: 8/16/2018  11:46 AM       To: Angelique West RN  Subject: Follow Up                                        Angelique,    Can you change Mr. Garcia's follow up to 6 weeks with [facility] with me? ThanksJavier

## 2018-08-17 NOTE — TELEPHONE ENCOUNTER
Per task move appointment out two weeks. Patient called to confirm and had questions. Told him nurse Angelique would give him a call

## 2018-08-17 NOTE — PROGRESS NOTES
Urology Postop Phone Note:    Mr. Jimmie Garcia is a 58 year old male who underwent Cystoscopy and Complex single stage posterior urethral reconstruction using excision and primary anastomosis, made more difficult due to prior radiation on 8/16/18 with Dr. George for a history of Membranous urethral stricture (2 cm).  His postoperative course was unremarkable and the patient was d/c to home on 8/16/18.    Fevers/chills: Patient denies any fever or chills.  Eating/drinking: Patient is able to eat and drink without any complaints.  Bowel habits: Patient reports no bowel movement since surgery.  Urine output Mancilla catheter in urethra and SP Color Pale Yellow Clarity Clear Odor None  Incisions: Patient denies any signs and symptoms of infection.  Pain: Patient reports pain as a 6 out of 10.  The pain is located incision site.  Narcotics: The patient has been taking Oxycodone 5 mg every 6 hours for pain medication and is able to control the pain.  Antibiotics: No    Follow up appointment scheduled on 10/1/18 at 1000 with Dr. Adkins.  [facility] at 0900.  Nurse visit for SP removal on 10/4/18 at 0940.    Informed the patient of the clinic triage nursing # ([phone]) and urology resident on call # for after hours concerns.    Angelique West, HELENE  Care Coordinator - Reconstructive Urology

## 2018-08-20 ENCOUNTER — CARE COORDINATION (OUTPATIENT)
Dept: UROLOGY | Facility: CLINIC | Age: 58
End: 2018-08-20

## 2018-08-20 NOTE — PROGRESS NOTES
Spoke with patient, states he's having intermittent right thigh numbness since this past weekend after his urethroplasty surgery on 8/16/18.  Patient states he only has the numbness feeling when he positions himself in certain positions and that there isn't the typical tingling feeling but just when he pinches himself in his right thigh it feels numb.  Spoke with Dr. Adkins, this is most likely due to the candy cane position he was in during his urethroplasty surgery.  Recommended he continue to watch it and if the numbness gets worse to contact the clinic.  Patient states understanding.    Angelique West RN  Care Coordinator- Reconstructive Urology

## 2018-09-04 ENCOUNTER — TELEPHONE (OUTPATIENT)
Dept: UROLOGY | Facility: CLINIC | Age: 58
End: 2018-09-04

## 2018-09-04 DIAGNOSIS — R31.9 HEMATURIA: ICD-10-CM

## 2018-09-04 DIAGNOSIS — R82.90 MALODOROUS URINE: ICD-10-CM

## 2018-09-04 DIAGNOSIS — R82.90 MALODOROUS URINE: Primary | ICD-10-CM

## 2018-09-04 LAB
ALBUMIN UR-MCNC: NEGATIVE MG/DL
APPEARANCE UR: CLEAR
BACTERIA #/AREA URNS HPF: ABNORMAL /HPF
BILIRUB UR QL STRIP: NEGATIVE
COLOR UR AUTO: YELLOW
GLUCOSE UR STRIP-MCNC: NEGATIVE MG/DL
HGB UR QL STRIP: ABNORMAL
KETONES UR STRIP-MCNC: NEGATIVE MG/DL
LEUKOCYTE ESTERASE UR QL STRIP: ABNORMAL
NITRATE UR QL: POSITIVE
NON-SQ EPI CELLS #/AREA URNS LPF: ABNORMAL /LPF
PH UR STRIP: 6 PH (ref 5–7)
RBC #/AREA URNS AUTO: ABNORMAL /HPF
SOURCE: ABNORMAL
SP GR UR STRIP: <=1.005 (ref 1–1.03)
UROBILINOGEN UR STRIP-ACNC: 0.2 EU/DL (ref 0.2–1)
WBC #/AREA URNS AUTO: ABNORMAL /HPF

## 2018-09-04 PROCEDURE — 87088 URINE BACTERIA CULTURE: CPT | Performed by: UROLOGY

## 2018-09-04 PROCEDURE — 87086 URINE CULTURE/COLONY COUNT: CPT | Performed by: UROLOGY

## 2018-09-04 PROCEDURE — 81001 URINALYSIS AUTO W/SCOPE: CPT | Performed by: UROLOGY

## 2018-09-04 PROCEDURE — 87186 SC STD MICRODIL/AGAR DIL: CPT | Performed by: UROLOGY

## 2018-09-04 NOTE — TELEPHONE ENCOUNTER
Spoke with patient, since Saturday he has had a malodorous urine, bloody discharge around catheter, leakage of urine around catheter, no fever/chills.  Placed order for UA/UC, patient will leave urine specimen at [facility] lab.    Angelique West RN  Care Coordinator- Reconstructive Urology

## 2018-09-04 NOTE — TELEPHONE ENCOUNTER
[facility] Call Center    Phone Message    May a detailed message be left on voicemail: yes    Reason for Call: Patient states he left a message over the weekend and has not received a call back. Patient wanted to report on some things that happened over the weekend regarding a procedure he had a couple weeks ago. Please follow up with patient.     Action Taken: Message routed to:  [facility] ([facility] Urology

## 2018-09-07 LAB
BACTERIA SPEC CULT: ABNORMAL
BACTERIA SPEC CULT: ABNORMAL
SPECIMEN SOURCE: ABNORMAL

## 2018-09-10 DIAGNOSIS — N39.0 URINARY TRACT INFECTION: Primary | ICD-10-CM

## 2018-09-10 RX ORDER — SULFAMETHOXAZOLE/TRIMETHOPRIM 800-160 MG
1 TABLET ORAL 2 TIMES DAILY
Qty: 10 TABLET | Refills: 0 | Status: SHIPPED | OUTPATIENT
Start: 2018-09-10 | End: 2018-09-15

## 2018-09-19 ENCOUNTER — PRE VISIT (OUTPATIENT)
Dept: UROLOGY | Facility: CLINIC | Age: 58
End: 2018-09-19

## 2018-09-19 NOTE — TELEPHONE ENCOUNTER
Patient with history of membranous urethral stricture coming in for post operative check up S/P cystoscopy and complex single stage posterior urethral reconstruction using excision and primary anastomosis, made more difficult due to prior radiation. Patient chart reviewed, no need for call, all records available and ready for appointment. Imaging prior.

## 2018-10-01 ENCOUNTER — OFFICE VISIT (OUTPATIENT)
Dept: UROLOGY | Facility: CLINIC | Age: 58
End: 2018-10-01
Payer: COMMERCIAL

## 2018-10-01 ENCOUNTER — RADIANT APPOINTMENT (OUTPATIENT)
Dept: GENERAL RADIOLOGY | Facility: CLINIC | Age: 58
End: 2018-10-01
Attending: UROLOGY
Payer: COMMERCIAL

## 2018-10-01 VITALS
WEIGHT: 200 LBS | HEART RATE: 80 BPM | HEIGHT: 70 IN | SYSTOLIC BLOOD PRESSURE: 152 MMHG | DIASTOLIC BLOOD PRESSURE: 84 MMHG | BODY MASS INDEX: 28.63 KG/M2

## 2018-10-01 DIAGNOSIS — N99.112 POSTPROCEDURAL MEMBRANOUS URETHRAL STRICTURE: Primary | ICD-10-CM

## 2018-10-01 DIAGNOSIS — N35.919 URETHRAL STRICTURE: ICD-10-CM

## 2018-10-01 RX ORDER — IOPAMIDOL 510 MG/ML
150 INJECTION, SOLUTION INTRAVASCULAR ONCE
Status: COMPLETED | OUTPATIENT
Start: 2018-10-01 | End: 2018-10-01

## 2018-10-01 RX ADMIN — IOPAMIDOL 150 ML: 510 INJECTION, SOLUTION INTRAVASCULAR at 08:46

## 2018-10-01 ASSESSMENT — PAIN SCALES - GENERAL: PAINLEVEL: NO PAIN (0)

## 2018-10-01 NOTE — MR AVS SNAPSHOT
After Visit Summary   10/1/2018    Jimmie Garcia    MRN: [id number]           Patient Information     Date Of Birth          1960        Visit Information        Provider Department      10/1/2018 10:00 AM Alli Adkins MD [facility] Urology and [facility] for Prostate and Urologic Cancers        Today's Diagnoses     Postprocedural membranous urethral stricture    -  1      Care Instructions    Please follow up in 2 month with a cystoscopy with        It was a pleasure meeting with you today.  Thank you for allowing me and my team the privilege of caring for you today.  YOU are the reason we are here, and I truly hope we provided you with the excellent service you deserve.  Please let us know if there is anything else we can do for you so that we can be sure you are leaving completely satisfied with your care experience.                   Follow-ups after your visit        Follow-up notes from your care team     Return in about 2 months (around 12/1/2018) for cysto with Dr. Madera.      Your next 10 appointments already scheduled     Oct 04, 2018  9:40 AM CDT   (Arrive by 9:25 AM)   Return Visit with  Prostate Cancer Ctr Nurse   [facility] Urology and [facility] for Prostate and Urologic Cancers ([facility])    [address] [address] [phone]              Who to contact     Please call your clinic at [phone] to:    Ask questions about your health    Make or cancel appointments    Discuss your medicines    Learn about your test results    Speak to your doctor            Additional Information About Your Visit        MyChart Information     [employer] gives you secure access to your electronic health record. If you see a primary care provider, you can also send messages to your care team and make appointments. If you have questions, please call your primary care clinic.  If you do not have a primary care provider, please call  "[phone] and they will assist you.      [employer] is an electronic gateway that provides easy, online access to your medical records. With [employer], you can request a clinic appointment, read your test results, renew a prescription or communicate with your care team.     To access your existing account, please contact your [facility] or call [phone] for assistance.        Care EveryWhere ID     This is your Care EveryWhere ID. This could be used by other organizations to access your [place] medical records  [id number]        Your Vitals Were     Pulse Height BMI (Body Mass Index)             80 1.765 m (5' 9.5\") 29.11 kg/m2          Blood Pressure from Last 3 Encounters:   10/01/18 152/84   08/16/18 111/75   08/06/18 133/80    Weight from Last 3 Encounters:   10/01/18 90.7 kg (200 lb)   08/16/18 90.8 kg (200 lb 2.8 oz)   08/06/18 90.7 kg (200 lb)              Today, you had the following     No orders found for display       Primary Care Provider Office Phone # Fax #    Adrian Jefferson [phone]       [place] [address] [place] [address]        Equal Access to Services     ELLI JERONIMO : Hadii aad ku hadasho Soomaali, waaxda luqadaha, qaybta kaalmada adeegyada, tevin salter hayiván pickens. So [facility] [phone].    ATENCIÓN: Si habla español, tiene a almazan disposición servicios gratuitos de asistencia lingüística. Llame al [phone].    We comply with applicable federal civil rights laws and [place] laws. We do not discriminate on the basis of race, color, national origin, age, disability, sex, sexual orientation, or gender identity.            Thank you!     Thank you for choosing [facility] UROLOGY AND [facility] FOR PROSTATE AND UROLOGIC CANCERS  for your care. Our goal is always to provide you with excellent care. Hearing back from our patients is one way we can continue to improve our services. Please take a few " minutes to complete the written survey that you may receive in the mail after your visit with us. Thank you!             Your Updated Medication List - Protect others around you: Learn how to safely use, store and throw away your medicines at www.disposemymeds.org.          This list is accurate as of 10/1/18 10:30 AM.  Always use your most recent med list.                   Brand Name Dispense Instructions for use Diagnosis    acetaminophen 325 MG tablet    TYLENOL    100 tablet    Take 1 tablet (325 mg) by mouth every 6 hours as needed    Radiation adverse effect, sequela       bacitracin ointment    [employer]S BACITRACIN ZINC    425 g    Apply topically 2 times daily    Radiation adverse effect, sequela       MULTIPLE VITAMIN PO      1 tablet daily        omeprazole 20 MG CR capsule    priLOSEC     Take 20 mg by mouth        oxybutynin 5 MG 24 hr tablet    DITROPAN XL    45 tablet    Take 1 tablet (5 mg) by mouth daily    Radiation adverse effect, sequela       oxyCODONE IR 5 MG tablet    ROXICODONE    30 tablet    Take 1 tablet (5 mg) by mouth every 6 hours as needed for other (Moderate to Severe Pain)    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       senna-docusate 8.6-50 MG per tablet    SENOKOT-S;PERICOLACE    60 tablet    Take 1-2 tablets by mouth daily as needed for constipation    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

## 2018-10-01 NOTE — PATIENT INSTRUCTIONS
Please follow up in 2 month with a cystoscopy with        It was a pleasure meeting with you today.  Thank you for allowing me and my team the privilege of caring for you today.  YOU are the reason we are here, and I truly hope we provided you with the excellent service you deserve.  Please let us know if there is anything else we can do for you so that we can be sure you are leaving completely satisfied with your care experience.

## 2018-10-01 NOTE — PROGRESS NOTES
"CC: Postop f/u s/p urethroplasty.    HPI: Mr. Jimmie Garcia is a pleasant 58 year old male who is s/p posterior EPA urethroplasty with Dr. Scottie Madera on 8/16/18. Pt has done very well.       Pain is controlled  Appetite is baseline  Bowel movements are baseline  Mancilla catheter has been taped to the lower abdomen until today's radiology appt.  Urine is draining through SP tube.    EXAM:  Blood pressure 152/84, pulse 80, height 1.765 m (5' 9.5\"), weight 90.7 kg (200 lb).   No evidence of respiratory distress.  Incision clean, dry, intact.  No tenderness or evidence of cellulitis.  No hematoma.    VCUG:   Smooth contour to urethra, no evidence of extravasation.    ASSESSMENT/PLAN:  - Excellent outcome after posterior EPA urethroplasty.  - Medication plan: One cipro 500 mg tablet taken this morning prior to imaging appointment.    - F/U in  months for uroflow, PVR and cystoscopy with Dr. Madera.    - No sexual intercourse x 6 weeks after surgery.  Bulbospongiosus muscles divided and need time to heal.  - Some dysuria and hematuria are normal the first week following catheter removal, but should resolve.   - Physical activity as tolerated.  If it is uncomfortable or painful, Mr. Garcia should not do it, ie - bike riding, lunges, etc.     I have enjoyed participating in the medical care of this very pleasant patient and will continue to keep you updated on his progress.    Answers for HPI/ROS submitted by the patient on 10/1/2018   PHQ-2 Score: 0    "

## 2018-10-01 NOTE — LETTER
"10/1/2018       RE: Jimmie Garcia  [address]  [facility] [address]     Dear Colleague,    Thank you for referring your patient, Jimmie Garcia, to the [facility] UROLOGY AND INST FOR PROSTATE AND UROLOGIC CANCERS at [facility]. Please see a copy of my visit note below.    CC: Postop f/u s/p urethroplasty.    HPI: Mr. Jimmie Garcia is a pleasant 58 year old male who is s/p posterior EPA urethroplasty with Dr. Scottie Madera on 8/16/18. Pt has done very well.       Pain is controlled  Appetite is baseline  Bowel movements are baseline  Mancilla catheter has been taped to the lower abdomen until today's radiology appt.  Urine is draining through SP tube.    EXAM:  Blood pressure 152/84, pulse 80, height 1.765 m (5' 9.5\"), weight 90.7 kg (200 lb).   No evidence of respiratory distress.  Incision clean, dry, intact.  No tenderness or evidence of cellulitis.  No hematoma.    VCUG:   Smooth contour to urethra, no evidence of extravasation.    ASSESSMENT/PLAN:  - Excellent outcome after posterior EPA urethroplasty.  - Medication plan: One cipro 500 mg tablet taken this morning prior to imaging appointment.    - F/U in  months for uroflow, PVR and cystoscopy with Dr. Madera.    - No sexual intercourse x 6 weeks after surgery.  Bulbospongiosus muscles divided and need time to heal.  - Some dysuria and hematuria are normal the first week following catheter removal, but should resolve.   - Physical activity as tolerated.  If it is uncomfortable or painful, Mr. Garcia should not do it, ie - bike riding, lunges, etc.     I have enjoyed participating in the medical care of this very pleasant patient and will continue to keep you updated on his progress.        Again, thank you for allowing me to participate in the care of your patient.      Sincerely,    Alli Adkins MD      "

## 2018-10-01 NOTE — NURSING NOTE
"Chief Complaint   Patient presents with     Surgical Followup     history of membranous urethral stricture coming in for post operative check up S/P cystoscopy and complex single stage posterior urethral reconstruction using excision and primary anastomosis, made more difficult due to prior radiation       Blood pressure 152/84, pulse 80, height 1.765 m (5' 9.5\"), weight 90.7 kg (200 lb). Body mass index is 29.11 kg/(m^2).    Patient Active Problem List   Diagnosis     Malignant neoplasm of prostate (H)     Radiation adverse effect, sequela     Postprocedural membranous urethral stricture       No Known Allergies    Current Outpatient Prescriptions   Medication Sig Dispense Refill     acetaminophen (TYLENOL) 325 MG tablet Take 1 tablet (325 mg) by mouth every 6 hours as needed 100 tablet 0     bacitracin ([employer]S BACITRACIN ZINC) ointment Apply topically 2 times daily 425 g 0     MULTIPLE VITAMIN PO 1 tablet daily        omeprazole (PRILOSEC) 20 MG CR capsule Take 20 mg by mouth       oxybutynin (DITROPAN XL) 5 MG 24 hr tablet Take 1 tablet (5 mg) by mouth daily 45 tablet 1     oxyCODONE IR (ROXICODONE) 5 MG tablet Take 1 tablet (5 mg) by mouth every 6 hours as needed for other (Moderate to Severe Pain) 30 tablet 0     senna-docusate (SENOKOT-S;PERICOLACE) 8.6-50 MG per tablet Take 1-2 tablets by mouth daily as needed for constipation 60 tablet 0       Social History   Substance Use Topics     Smoking status: Former Smoker     Quit date: 04/1993     Smokeless tobacco: Never Used     Alcohol use Yes      Comment: a couple of beers per week       Taniya Campbell LPN  10/1/2018  9:24 AM    "

## 2018-11-23 ENCOUNTER — PRE VISIT (OUTPATIENT)
Dept: UROLOGY | Facility: CLINIC | Age: 58
End: 2018-11-23

## 2018-11-23 NOTE — TELEPHONE ENCOUNTER
Reason for visit: cystoscopy     Relevant information: 3 month urethroplasty    Records/imaging/labs: all records available    Pt called: yes, generic message left    Rooming: flow/pvr

## 2018-12-03 ENCOUNTER — OFFICE VISIT (OUTPATIENT)
Dept: UROLOGY | Facility: CLINIC | Age: 58
End: 2018-12-03
Payer: COMMERCIAL

## 2018-12-03 VITALS
SYSTOLIC BLOOD PRESSURE: 128 MMHG | BODY MASS INDEX: 29.35 KG/M2 | WEIGHT: 205 LBS | HEART RATE: 83 BPM | HEIGHT: 70 IN | DIASTOLIC BLOOD PRESSURE: 81 MMHG

## 2018-12-03 DIAGNOSIS — T66.XXXD RADIATION ADVERSE EFFECT, SUBSEQUENT ENCOUNTER: ICD-10-CM

## 2018-12-03 DIAGNOSIS — Z87.448 HISTORY OF URETHRAL STRICTURE: Primary | ICD-10-CM

## 2018-12-03 PROBLEM — K40.20 NON-RECURRENT BILATERAL INGUINAL HERNIA WITHOUT OBSTRUCTION OR GANGRENE: Status: ACTIVE | Noted: 2018-02-07

## 2018-12-03 ASSESSMENT — PAIN SCALES - GENERAL: PAINLEVEL: NO PAIN (0)

## 2018-12-03 NOTE — PATIENT INSTRUCTIONS
"Schedule appointment with Dr. Hill in 8 months for Cystoscopy and urine flow test.    It was a pleasure meeting with you today.  Thank you for allowing me and my team the privilege of caring for you today.  YOU are the reason we are here, and I truly hope we provided you with the excellent service you deserve.  Please let us know if there is anything else we can do for you so that we can be sure you are leaving completely satisfied with your care experience.        Yoselin Jimenez LPN      AFTER YOUR CYSTOSCOPY        You have just completed a cystoscopy, or \"cysto\", which allowed your physician to learn more about your bladder (or to remove a stent placed after surgery). We suggest that you continue to avoid caffeine, fruit juice, and alcohol for the next 24 hours, however, you are encouraged to return to your normal activities.         A few things that are considered normal after your cystoscopy:     * Small amount of bleeding (or spotting) that clears within the next 24 hours     * Slight burning sensation with urination     * Sensation to of needing to avoid more frequently     * The feeling of \"air\" in your urine     * Mild discomfort that is relieved with Tylenol        Please contact our office promptly if you:     * Develop a fever above 101 degrees     * Are unable to urinate     * Develop bright red blood that does not stop     * Severe pain or swelling         Please contact our office with any concerns or questions @DEPHN.  "

## 2018-12-03 NOTE — NURSING NOTE
Invasive Procedure Safety Checklist:    Procedure: Cystoscopy    Action: Complete sections and checkboxes as appropriate.    Pre-procedure:  1. Patient ID Verified with 2 identifiers (Lore and  or MRN) : YES    2. Procedure and site verified with patient/designee (when able) : YES    3. Accurate consent documentation in medical record : YES    4. H&P (or appropriate assessment) documented in medical record : NO  H&P must be up to 30 days prior to procedure an updated within 24 hours of                 Procedure as applicable.     5. Relevant diagnostic and radiology test results appropriately labeled and displayed as applicable : NO    6. Blood products, implants, devices, and/or special equipment available for the procedure as applicable : NO    7. Procedure site(s) marked with provider initials [Exclusions: ] : NO    8. Marking not required. Reason : Yes  Procedure does not require site marking    Time Out:     Time-Out performed immediately prior to starting procedure, including verbal and active participation of all team members addressing: YES    1. Correct patient identity.  2. Confirmed that the correct side and site are marked.  3. An accurate procedure to be done.  4. Agreement on the procedure to be done.  5. Correct patient position.  6. Relevant images and results are properly labeled and appropriately displayed.  7. The need to administer antibiotics or fluids for irrigation purposes during the procedure as applicable.  8. Safety precautions based on patient history or medication use.    During Procedure: Verification of correct person, site, and procedure occurs any time the responsibility for care of the patient is transferred to another member of the care team.    ANA M Weeks

## 2018-12-03 NOTE — MR AVS SNAPSHOT
After Visit Summary   12/3/2018    Jimmie Garcia    MRN: [id number]           Patient Information     Date Of Birth          1960        Visit Information        Provider Department      12/3/2018 7:30 AM Scottie Madera MD [facility] Urology and [facility] for Prostate and Urologic C[facility]        Care Instructions    Schedule appointment with Dr. Hill in 8 months for Cystoscopy and urine flow test.    It was a pleasure meeting with you today.  Thank you for allowing me and my team the privilege of caring for you today.  YOU are the reason we are here, and I truly hope we provided you with the excellent service you deserve.  Please let us know if there is anything else we can do for you so that we can be sure you are leaving completely satisfied with your care experience.        Yoselin Jimenez LPN            Follow-ups after your visit        Who to contact     Please call your clinic at [phone] to:    Ask questions about your health    Make or cancel appointments    Discuss your medicines    Learn about your test results    Speak to your doctor            Additional Information About Your Visit        MyChart Information     [employer] gives you secure access to your electronic health record. If you see a primary care provider, you can also send messages to your care team and make appointments. If you have questions, please call your primary care clinic.  If you do not have a primary care provider, please call [phone] and they will assist you.      [employer] is an electronic gateway that provides easy, online access to your medical records. With [employer], you can request a clinic appointment, read your test results, renew a prescription or communicate with your care team.     To access your existing account, please contact your [facility] or call [phone] for assistance.        Care EveryWhere ID     This is your Care EveryWhere ID. This could be used by  "other organizations to access your [place] medical records  [id number]        Your Vitals Were     Pulse Height BMI (Body Mass Index)             83 1.765 m (5' 9.5\") 29.84 kg/m2          Blood Pressure from Last 3 Encounters:   12/03/18 128/81   10/01/18 152/84   08/16/18 111/75    Weight from Last 3 Encounters:   12/03/18 93 kg (205 lb)   10/01/18 90.7 kg (200 lb)   08/16/18 90.8 kg (200 lb 2.8 oz)              Today, you had the following     No orders found for display       Primary Care Provider Office Phone # Fax #    Adrian Jefferson [phone]       [place] [id number] [address]        Equal Access to Services     [facility] : Hadii chito romeoo Sovijaya, waaxda luqadaha, qaybta kaalmada colette, tevin rehman . So [facility] [phone].    ATENCIÓN: Si habla español, tiene a almazan disposición servicios gratuitos de asistencia lingüística. Zulay al [phone].    We comply with applicable federal civil rights laws and [place] laws. We do not discriminate on the basis of race, color, national origin, age, disability, sex, sexual orientation, or gender identity.            Thank you!     Thank you for choosing [facility] UROLOGY AND [facility] FOR PROSTATE AND UROLOGIC CANCERS  for your care. Our goal is always to provide you with excellent care. Hearing back from our patients is one way we can continue to improve our services. Please take a few minutes to complete the written survey that you may receive in the mail after your visit with us. Thank you!             Your Updated Medication List - Protect others around you: Learn how to safely use, store and throw away your medicines at www.disposemymeds.org.          This list is accurate as of 12/3/18  7:58 AM.  Always use your most recent med list.                   Brand Name Dispense Instructions for use Diagnosis    acetaminophen 325 MG tablet    TYLENOL    100 tablet    Take " 1 tablet (325 mg) by mouth every 6 hours as needed    Radiation adverse effect, sequela       bacitracin 500 UNIT/GM external ointment    [employer] BACITRACIN ZINC    425 g    Apply topically 2 times daily    Radiation adverse effect, sequela       MULTIPLE VITAMIN PO      1 tablet daily        omeprazole 20 MG DR capsule    priLOSEC     Take 20 mg by mouth        oxybutynin ER 5 MG 24 hr tablet    DITROPAN XL    45 tablet    Take 1 tablet (5 mg) by mouth daily    Radiation adverse effect, sequela       oxyCODONE 5 MG tablet    ROXICODONE    30 tablet    Take 1 tablet (5 mg) by mouth every 6 hours as needed for other (Moderate to Severe Pain)    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       senna-docusate 8.6-50 MG tablet    SENOKOT-S/PERICOLACE    60 tablet    Take 1-2 tablets by mouth daily as needed for constipation    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

## 2018-12-03 NOTE — LETTER
12/3/2018       RE: Jimmie Garcia  [address]  [facility] [address]     Dear Colleague,    Thank you for referring your patient, Jimmie Garcia, to the [facility] UROLOGY AND INST FOR PROSTATE AND UROLOGIC CANCERS at [facility]. Please see a copy of my visit note below.    .[email or web address]    Urethroplasty Follow-up Visit with Surveillance Urethroscopy    PRE-PROCEDURE DIAGNOSIS: History of urethral stricture  POST-PROCEDURE DIAGNOSIS: No evidence of urethral stricture   PROCEDURE: Urethroscopy    HISTORY: Jimmie Garcia is a 58 year old man 3 months status-post anastomotic urethroplasty for membranous urethral stricture.     BMG complaints: No  Positioning complaints: No  Perineal / Genital complaints: No  Urinary incontinence: No  -rare urge UI; no stress UI    Questionnaires reviewed. See flowsheet for details.    REVIEW OF OFFICE STUDIES:      Urinary Flow Rate  Peak Flow: 41.8 mL/s  Average Flow: 18.4 mL/s  Voided (mL): 234 mL  Bell shaped    DESCRIPTION OF PROCEDURE:  After informed consent was obtained, the patient was brought to the procedure room where he was placed in the supine position with all pressure points well padded.  He was prepped and draped in a sterile fashion. A flexible cystoscope was introduced through a well-lubricated urethra.  The anterior urethra up to the point of reconstruction was normal in appearance. At the site of reconstruction there was not evidence of stricture recurrence. The narrowest caliber of the urethra at the reconstruction was estimated to be 28F and this was located in membranous urethra. The flexible cystoscope passed easily. The voluntary sphincter was identified and the scope withdrawn.    ASSESSMENT AND PLAN:  Excellent outcome after urethroplasty. The patient will follow-up in 9 months with with uroflowmetry, post-void residual urine volume measurement, Urethroplasty PROM, GEETA, MSHQ, and CLSS and post-op questionnaires. Cystoscopy  will be needed. If symptoms recur cystoscopy will be done sooner.    Again, thank you for allowing me to participate in the care of your patient.      Sincerely,    Scottie Madera MD

## 2018-12-03 NOTE — NURSING NOTE
"Chief Complaint   Patient presents with     RECHECK     Stricture follow up with cystoscopy       Blood pressure 128/81, pulse 83, height 1.765 m (5' 9.5\"), weight 93 kg (205 lb). Body mass index is 29.84 kg/(m^2).    Patient Active Problem List   Diagnosis     Malignant neoplasm of prostate (H)     Radiation adverse effect, sequela     Postprocedural membranous urethral stricture     Non-recurrent bilateral inguinal hernia without obstruction or gangrene       No Known Allergies    Current Outpatient Prescriptions   Medication Sig Dispense Refill     acetaminophen (TYLENOL) 325 MG tablet Take 1 tablet (325 mg) by mouth every 6 hours as needed 100 tablet 0     MULTIPLE VITAMIN PO 1 tablet daily        omeprazole (PRILOSEC) 20 MG CR capsule Take 20 mg by mouth       bacitracin ([employer] BACITRACIN ZINC) ointment Apply topically 2 times daily (Patient not taking: Reported on 12/3/2018) 425 g 0     oxybutynin (DITROPAN XL) 5 MG 24 hr tablet Take 1 tablet (5 mg) by mouth daily (Patient not taking: Reported on 12/3/2018) 45 tablet 1     oxyCODONE IR (ROXICODONE) 5 MG tablet Take 1 tablet (5 mg) by mouth every 6 hours as needed for other (Moderate to Severe Pain) (Patient not taking: Reported on 12/3/2018) 30 tablet 0     senna-docusate (SENOKOT-S;PERICOLACE) 8.6-50 MG per tablet Take 1-2 tablets by mouth daily as needed for constipation (Patient not taking: Reported on 12/3/2018) 60 tablet 0       Social History   Substance Use Topics     Smoking status: Former Smoker     Quit date: 04/1993     Smokeless tobacco: Never Used     Alcohol use Yes      Comment: a couple of beers per week       Yoselin Jimenez LPN  12/3/2018  8:19 AM       "

## 2018-12-03 NOTE — NURSING NOTE
Chief Complaint   Patient presents with     RECHECK     Stricture follow up with cystoscopy     ANA M Weeks

## 2018-12-03 NOTE — PROGRESS NOTES
Urethroplasty Follow-up Visit with Surveillance Urethroscopy    PRE-PROCEDURE DIAGNOSIS: History of urethral stricture  POST-PROCEDURE DIAGNOSIS: No evidence of urethral stricture   PROCEDURE: Urethroscopy    HISTORY: Jimmie Garcia is a 58 year old man 3 months status-post anastomotic urethroplasty for membranous urethral stricture.     BMG complaints: No  Positioning complaints: No  Perineal / Genital complaints: No  Urinary incontinence: No  -rare urge UI; no stress UI      Questionnaires reviewed. See flowsheet for details.    REVIEW OF OFFICE STUDIES:      Urinary Flow Rate  Peak Flow: 41.8 mL/s  Average Flow: 18.4 mL/s  Voided (mL): 234 mL  Bell shaped    DESCRIPTION OF PROCEDURE:  After informed consent was obtained, the patient was brought to the procedure room where he was placed in the supine position with all pressure points well padded.  He was prepped and draped in a sterile fashion. A flexible cystoscope was introduced through a well-lubricated urethra.  The anterior urethra up to the point of reconstruction was normal in appearance. At the site of reconstruction there was not evidence of stricture recurrence. The narrowest caliber of the urethra at the reconstruction was estimated to be 28F and this was located in membranous urethra. The flexible cystoscope passed easily. The voluntary sphincter was identified and the scope withdrawn.    ASSESSMENT AND PLAN:  Excellent outcome after urethroplasty. The patient will follow-up in 9 months with with uroflowmetry, post-void residual urine volume measurement, Urethroplasty PROM, GEETA, MSHQ, and CLSS and post-op questionnaires. Cystoscopy will be needed. If symptoms recur cystoscopy will be done sooner.

## 2019-03-27 DIAGNOSIS — N52.01 ERECTILE DYSFUNCTION DUE TO ARTERIAL INSUFFICIENCY: Primary | ICD-10-CM

## 2019-03-27 RX ORDER — SILDENAFIL CITRATE 20 MG/1
20 TABLET ORAL PRN
Qty: 10 TABLET | Refills: 3 | Status: SHIPPED | OUTPATIENT
Start: 2019-03-27 | End: 2019-04-06

## 2019-09-03 ENCOUNTER — PRE VISIT (OUTPATIENT)
Dept: UROLOGY | Facility: CLINIC | Age: 59
End: 2019-09-03

## 2019-09-03 NOTE — TELEPHONE ENCOUNTER
Reason for visit: Cystoscopy     Relevant information: one year post urethroplasty    Records/imaging/labs/orders: all records available    Pt called: yes    At Rooming: flow/pvr

## 2019-09-11 ENCOUNTER — OFFICE VISIT (OUTPATIENT)
Dept: UROLOGY | Facility: CLINIC | Age: 59
End: 2019-09-11
Payer: COMMERCIAL

## 2019-09-11 VITALS
WEIGHT: 205 LBS | SYSTOLIC BLOOD PRESSURE: 148 MMHG | DIASTOLIC BLOOD PRESSURE: 90 MMHG | HEART RATE: 58 BPM | BODY MASS INDEX: 29.35 KG/M2 | HEIGHT: 70 IN

## 2019-09-11 DIAGNOSIS — Z87.448 HISTORY OF URETHRAL STRICTURE: Primary | ICD-10-CM

## 2019-09-11 ASSESSMENT — PAIN SCALES - GENERAL: PAINLEVEL: NO PAIN (0)

## 2019-09-11 ASSESSMENT — MIFFLIN-ST. JEOR: SCORE: 1756.12

## 2019-09-11 NOTE — PROGRESS NOTES
Urology Clinic    Scottie Madera MD  Date of Service: 2019     Name: Jimmie Garcia  MRN: [id number]  Age: 59 year old  : 1960  Referring provider: Alli Adkins     Assessment and Plan:  Assessment:  Jimmie Garcia  is a 59 year old male 13 months status-post anastomotic membranous urethroplasty for urethral stricture that occurred after BT. No complaints today. No evidence of recurrence today.     Plan:  Excellent outcome after urethroplasty. The patient will follow-up in 12 months with uroflowmetry, post-void residual urine volume measurement, Urethroplasty PROM, GEETA, MSHQ, and CLSS and post-op questionnaires. Cystoscopy will not be needed. If symptoms recur cystoscopy will be done sooner.    Leeanna Tucker MD  Reconstructive Urology Fellow    As the attending surgeon I, Scottie Madera, was present throughout the procedure.    ______________________________________________________________________    HPI  Jimmie Garcia is a 59 year old man 13 months status-post anastomotic membranous urethroplasty for urethral stricture that occurred after BT. No complaints today. Good stream. No hematuria or straining or UTIs. Very pleased.     Questionnaires reviewed. See flowsheet for details.    Review of Systems:   Pertinent items are noted in HPI or as below, remainder of complete ROS is negative.      REVIEW OF OFFICE STUDIES:   UROFLOW:   Volume: 811cc  Qmax: 46cc/s  Qmean: 27cc/s  PVR: 31cc    DESCRIPTION OF PROCEDURE:  After informed consent was obtained, the patient was brought to the procedure room where he was placed in the supine position with all pressure points well padded.  He was prepped and draped in a sterile fashion. A flexible cystoscope was introduced through a well-lubricated urethra.  The anterior urethra up to the point of reconstruction was normal in appearance. At the site of reconstruction there was not evidence of stricture recurrence. The narrowest caliber of the urethra at the  "reconstruction was estimated to be 26F and this was located in the membranous urethra. The flexible cystoscope passed easily. The voluntary sphincter was identified and the scope withdrawn.    Physical Exam:   Estimated body mass index is 29.84 kg/m  as calculated from the following:    Height as of 12/3/18: 1.765 m (5' 9.5\").    Weight as of 12/3/18: 93 kg (205 lb).  Constitutional: healthy, alert and no distress   Cardiovascular: regular rate, normal perfusion  Respiratory: normal work of breathing  Psychiatric: mentation appears normal and affect normal/bright  Head: Normocephalic. EOMI. Moist membranes  Abdomen: Abdomen soft, non-tender.   : circ male, ortho meatus, no skin lesions  NEURO: Gait normal. Grossly non-focal  SKIN: Soft, dry  JOINT/EXTREMITIES: extremities normal - no gross deformities noted and gait stevie       Laboratory:   I personally reviewed all applicable laboratory data and went over findings with patient  Significant for:    UA RESULTS:   Recent Labs   Lab Test 09/04/18  1451   SG <=1.005   URINEPH 6.0   NITRITE Positive*   RBCU O - 2   WBCU 10-25*     Imaging:   I personally reviewed all applicable imaging and went over the below findings with patient.    Results for orders placed or performed in visit on 10/01/18   X-Ray Voiding cystogram    Narrative    EXAMINATION: [facility]  10/1/2018 9:24 AM      CLINICAL HISTORY: Ureteral stricture  Additional information obtained from EMR: Urethral stricture status  post urethroplasty on 8/16/2018    COMPARISON: All UG/VCUG on 8/6/2018        PROCEDURE COMMENTS:   Fluoroscopy time: 0.7 low-dose pulsed  Contrast: 200 mL Isovue-300   Bladder catheter: Urethral Mancilla catheter has been removed. Contrast  material were introduced through the suprapubic catheter.    FINDINGS:  The  radiograph shows no radio-opaque calcification.  Brachytherapy seeds projecting over the prostate. No acute osseous  abnormality.    The bladder was filled 200 mL with " contrast to the point of  spontaneous voiding and appeared smooth walled with a tiny anterior  bladder diverticulum.    Voiding demonstrates postoperative changes of the urethroplasty with  interval resolution of membranous and prostatic urethral stricture.  Fluoroscopic images were not recorded to the PACS due to technical  issue. There is minimal post-void residual.           Impression    IMPRESSION:  Postoperative changes of the urethroplasty with interval resolution of  membranous and prostatic urethral stricture.    I, TRINI GONZALEZ MD, attest that I was present for all critical  portions of the procedure and was immediately available to provide  guidance and assistance during the remainder of the procedure.    I have personally reviewed the examination and initial interpretation  and I agree with the findings.    TRINI GONZALEZ MD       Scribe Disclosure:  Josi Heard, a scribe, prepared the chart for today's encounter.

## 2019-09-11 NOTE — LETTER
2019       RE: Jimmie Garcia  [address] [facility] [address]     Dear Colleague,    Thank you for referring your patient, Jimmie Garcia, to the [facility] UROLOGY AND INST FOR PROSTATE AND UROLOGIC CANCERS at [facility]. Please see a copy of my visit note below.      Urology Clinic    Scottie Madera MD  Date of Service: 2019     Name: Jimmie Garcia  MRN: [id number]  Age: 59 year old  : 1960  Referring provider: Alli Adkins     Assessment and Plan:  Assessment:  Jimmie Garcia  is a 59 year old male 13 months status-post anastomotic membranous urethroplasty for urethral stricture that occurred after BT. No complaints today. No evidence of recurrence today.     Plan:  Excellent outcome after urethroplasty. The patient will follow-up in 12 months with uroflowmetry, post-void residual urine volume measurement, Urethroplasty PROM, GEETA, MSHQ, and CLSS and post-op questionnaires. Cystoscopy will not be needed. If symptoms recur cystoscopy will be done sooner.    Leeanna Tucker MD  Reconstructive Urology Fellow    As the attending surgeon I, Scottie Madera, was present throughout the procedure.    ______________________________________________________________________    HPI  Jimmie Garcia is a 59 year old man 13 months status-post anastomotic membranous urethroplasty for urethral stricture that occurred after BT. No complaints today. Good stream. No hematuria or straining or UTIs. Very pleased.     Questionnaires reviewed. See flowsheet for details.    Review of Systems:   Pertinent items are noted in HPI or as below, remainder of complete ROS is negative.      REVIEW OF OFFICE STUDIES:   UROFLOW:   Volume: 811cc  Qmax: 46cc/s  Qmean: 27cc/s  PVR: 31cc    DESCRIPTION OF PROCEDURE:  After informed consent was obtained, the patient was brought to the procedure room where he was placed in the supine position with all pressure points well padded.  He was  "prepped and draped in a sterile fashion. A flexible cystoscope was introduced through a well-lubricated urethra.  The anterior urethra up to the point of reconstruction was normal in appearance. At the site of reconstruction there was not evidence of stricture recurrence. The narrowest caliber of the urethra at the reconstruction was estimated to be 26F and this was located in the membranous urethra. The flexible cystoscope passed easily. The voluntary sphincter was identified and the scope withdrawn.    Physical Exam:   Estimated body mass index is 29.84 kg/m  as calculated from the following:    Height as of 12/3/18: 1.765 m (5' 9.5\").    Weight as of 12/3/18: 93 kg (205 lb).  Constitutional: healthy, alert and no distress   Cardiovascular: regular rate, normal perfusion  Respiratory: normal work of breathing  Psychiatric: mentation appears normal and affect normal/bright  Head: Normocephalic. EOMI. Moist membranes  Abdomen: Abdomen soft, non-tender.   : circ male, ortho meatus, no skin lesions  NEURO: Gait normal. Grossly non-focal  SKIN: Soft, dry  JOINT/EXTREMITIES: extremities normal - no gross deformities noted and gait stevie       Laboratory:   I personally reviewed all applicable laboratory data and went over findings with patient  Significant for:    UA RESULTS:   Recent Labs   Lab Test 09/04/18  1451   SG <=1.005   URINEPH 6.0   NITRITE Positive*   RBCU O - 2   WBCU 10-25*     Imaging:   I personally reviewed all applicable imaging and went over the below findings with patient.    Results for orders placed or performed in visit on 10/01/18   X-Ray Voiding cystogram    Narrative    EXAMINATION: [facility]G  10/1/2018 9:24 AM      CLINICAL HISTORY: Ureteral stricture  Additional information obtained from EMR: Urethral stricture status  post urethroplasty on 8/16/2018    COMPARISON: All UG/VCUG on 8/6/2018        PROCEDURE COMMENTS:   Fluoroscopy time: 0.7 low-dose pulsed  Contrast: 200 mL Isovue-300   Bladder " catheter: Urethral Mancilla catheter has been removed. Contrast  material were introduced through the suprapubic catheter.    FINDINGS:  The  radiograph shows no radio-opaque calcification.  Brachytherapy seeds projecting over the prostate. No acute osseous  abnormality.    The bladder was filled 200 mL with contrast to the point of  spontaneous voiding and appeared smooth walled with a tiny anterior  bladder diverticulum.    Voiding demonstrates postoperative changes of the urethroplasty with  interval resolution of membranous and prostatic urethral stricture.  Fluoroscopic images were not recorded to the PACS due to technical  issue. There is minimal post-void residual.           Impression    IMPRESSION:  Postoperative changes of the urethroplasty with interval resolution of  membranous and prostatic urethral stricture.    I, TRINI GONZALEZ MD, attest that I was present for all critical  portions of the procedure and was immediately available to provide  guidance and assistance during the remainder of the procedure.    I have personally reviewed the examination and initial interpretation  and I agree with the findings.    TRINI GONZALEZ MD       Scribe Disclosure:  Josi Heard, a scribe, prepared the chart for today's encounter.       Again, thank you for allowing me to participate in the care of your patient.      Sincerely,    Scottie Madera MD

## 2019-09-11 NOTE — PATIENT INSTRUCTIONS
"Please follow up with Dr. Madera in 1 year with Cystoscopy and uroflow PVR.    It was a pleasure meeting with you today.  Thank you for allowing me and my team the privilege of caring for you today.  YOU are the reason we are here, and I truly hope we provided you with the excellent service you deserve.  Please let us know if there is anything else we can do for you so that we can be sure you are leaving completely satisfied with your care experience.        Delio Banks, EMT  AFTER YOUR CYSTOSCOPY        You have just completed a cystoscopy, or \"cysto\", which allowed your physician to learn more about your bladder (or to remove a stent placed after surgery). We suggest that you continue to avoid caffeine, fruit juice, and alcohol for the next 24 hours, however, you are encouraged to return to your normal activities.         A few things that are considered normal after your cystoscopy:     * Small amount of bleeding (or spotting) that clears within the next 24 hours     * Slight burning sensation with urination     * Sensation to of needing to avoid more frequently     * The feeling of \"air\" in your urine     * Mild discomfort that is relieved with Tylenol        Please contact our office promptly if you:     * Develop a fever above 101 degrees     * Are unable to urinate     * Develop bright red blood that does not stop     * Severe pain or swelling         Please contact our office with any concerns or questions @[email or web address].  "

## 2019-09-11 NOTE — NURSING NOTE
"Chief Complaint   Patient presents with     Cystoscopy     Cysto       Blood pressure (!) 148/90, pulse 58, height 1.778 m (5' 10\"), weight 93 kg (205 lb). Body mass index is 29.41 kg/m .    Patient Active Problem List   Diagnosis     Malignant neoplasm of prostate (H)     Radiation adverse effect, sequela     Postprocedural membranous urethral stricture     Non-recurrent bilateral inguinal hernia without obstruction or gangrene       No Known Allergies    Current Outpatient Medications   Medication Sig Dispense Refill     acetaminophen (TYLENOL) 325 MG tablet Take 1 tablet (325 mg) by mouth every 6 hours as needed 100 tablet 0     MULTIPLE VITAMIN PO 1 tablet daily        omeprazole (PRILOSEC) 20 MG CR capsule Take 20 mg by mouth       bacitracin ([employer] BACITRACIN ZINC) ointment Apply topically 2 times daily (Patient not taking: Reported on 12/3/2018) 425 g 0     oxybutynin (DITROPAN XL) 5 MG 24 hr tablet Take 1 tablet (5 mg) by mouth daily (Patient not taking: Reported on 12/3/2018) 45 tablet 1     oxyCODONE IR (ROXICODONE) 5 MG tablet Take 1 tablet (5 mg) by mouth every 6 hours as needed for other (Moderate to Severe Pain) (Patient not taking: Reported on 12/3/2018) 30 tablet 0     senna-docusate (SENOKOT-S;PERICOLACE) 8.6-50 MG per tablet Take 1-2 tablets by mouth daily as needed for constipation (Patient not taking: Reported on 12/3/2018) 60 tablet 0     sildenafil (REVATIO) 20 MG tablet Take 1 tablet (20 mg) by mouth as needed (erectile dysfunction) 10 tablet 3       Social History     Tobacco Use     Smoking status: Former Smoker     Last attempt to quit: 1993     Years since quittin.4     Smokeless tobacco: Never Used   Substance Use Topics     Alcohol use: Yes     Comment: a couple of beers per week     Drug use: Romina Banks  2019  1:21 PM  "

## 2020-03-11 ENCOUNTER — HEALTH MAINTENANCE LETTER (OUTPATIENT)
Age: 60
End: 2020-03-11

## 2020-08-26 ENCOUNTER — TELEPHONE (OUTPATIENT)
Dept: UROLOGY | Facility: CLINIC | Age: 60
End: 2020-08-26

## 2020-08-26 NOTE — TELEPHONE ENCOUNTER
Left a detailed VM with the pod number for pt to call back and reschedule his cysto with Dr Madera. Dr Madera is having split days and mornings are virtual and afternoon in person. I did hold a spot on 9/21 with Dr Javed for patient.

## 2020-08-26 NOTE — TELEPHONE ENCOUNTER
[facility] Call Center    Phone Message    May a detailed message be left on voicemail: yes     Reason for Call: Other: pt received a call to reschedule his cysto, there is a note, and states to move to the afternoon, there are no openings that same day, notes are not very clear, if im supposed to add him on to the afternoon, or reschedule for a different day in the afternoon, please call ba thanks!     Action Taken: Message routed to:  [facility] ([facility]): urology    Travel Screening: Not Applicable

## 2020-09-02 NOTE — TELEPHONE ENCOUNTER
Left a detailed VM that patient is move to Dr Javed on 9/21. Dr Madera is only seeing patients in person 1/2 days. Asked for a call back to confirm.

## 2020-09-15 ENCOUNTER — PRE VISIT (OUTPATIENT)
Dept: UROLOGY | Facility: CLINIC | Age: 60
End: 2020-09-15

## 2020-09-15 NOTE — TELEPHONE ENCOUNTER
Visit Type : Clinic-Return    Hx/Sx: s/p stricture repair    Records/Orders: Yes    Pt Contacted: n/a    At Rooming: Flow/PVR, Urethroplasty PROM, GEETA, MSHQ, and CLSS and post-op questionnaires.

## 2021-01-03 ENCOUNTER — HEALTH MAINTENANCE LETTER (OUTPATIENT)
Age: 61
End: 2021-01-03

## 2021-04-25 ENCOUNTER — HEALTH MAINTENANCE LETTER (OUTPATIENT)
Age: 61
End: 2021-04-25

## 2021-08-10 DIAGNOSIS — N52.1 ERECTILE DYSFUNCTION DUE TO DISEASES CLASSIFIED ELSEWHERE: Primary | ICD-10-CM

## 2021-08-10 RX ORDER — SILDENAFIL 100 MG/1
50-100 TABLET, FILM COATED ORAL DAILY PRN
Qty: 10 TABLET | Refills: 4 | Status: SHIPPED | OUTPATIENT
Start: 2021-08-10

## 2021-08-12 ENCOUNTER — PRE VISIT (OUTPATIENT)
Dept: UROLOGY | Facility: CLINIC | Age: 61
End: 2021-08-12

## 2021-08-12 NOTE — TELEPHONE ENCOUNTER
Reason for visit: urethral stricture follow up     Relevant information: history of urethroplasty    Problem list   Patient Active Problem List   Diagnosis     Malignant neoplasm of prostate (H)     Radiation adverse effect, sequela     Postprocedural membranous urethral stricture     Non-recurrent bilateral inguinal hernia without obstruction or gangrene       Records/imaging/labs/orders: all records available    Pt called: message left attempting to schedule visit    At Rooming: flow/pvr, questionnaires    **If pt call back please offer/schedule 8/23/21 at 1:15 or 3:15.

## 2021-10-10 ENCOUNTER — HEALTH MAINTENANCE LETTER (OUTPATIENT)
Age: 61
End: 2021-10-10

## 2022-05-21 ENCOUNTER — HEALTH MAINTENANCE LETTER (OUTPATIENT)
Age: 62
End: 2022-05-21

## 2022-09-18 ENCOUNTER — HEALTH MAINTENANCE LETTER (OUTPATIENT)
Age: 62
End: 2022-09-18

## 2023-06-04 ENCOUNTER — HEALTH MAINTENANCE LETTER (OUTPATIENT)
Age: 63
End: 2023-06-04

## 2025-03-28 NOTE — TELEPHONE ENCOUNTER
Called to schedule surgery on 8/16/18 @ 7:30 am @ [place] [facility] with Dr Madera.  Surgery packet mailed on 6/29/18.  
not applicable

## (undated) DEVICE — GUIDEWIRE SENSOR DUAL FLEX STR 0.035"X150CM M0066703080

## (undated) DEVICE — SU VICRYL 4-0 RB-1 27" UND J214H

## (undated) DEVICE — PREP POVIDONE IODINE SOLUTION 10% 4OZ

## (undated) DEVICE — TUBING IRRIG CYSTO/BLADDER SET 81" LF 2C4040

## (undated) DEVICE — DEVICE CATH STABILIZATION STATLOCK FOLEY 3-WAY FOL0105

## (undated) DEVICE — PACK GOWN 3/PK DISP XL SBA32GPFCB

## (undated) DEVICE — VESSEL LOOPS BLUE SUPERMAXI 011022PBX

## (undated) DEVICE — GLOVE PROTEXIS W/NEU-THERA 8.0  2D73TE80

## (undated) DEVICE — DRAPE SHEET REV FOLD 3/4 9349

## (undated) DEVICE — BLADE CLIPPER SGL USE 9680

## (undated) DEVICE — PACK NEURO MINOR UMMC SNE32MNMU4

## (undated) DEVICE — ENDO SEAL BX PORT BPS-A

## (undated) DEVICE — CATH PLUG W/CAP 000076

## (undated) DEVICE — CATH FOLYSIL 16FR 15ML AA6116

## (undated) DEVICE — STOCKING SLEEVE VASOPRESS COMPRESSION CALF MED 18" VP501M

## (undated) DEVICE — CATH INTRODUCER SUPRAPUBIC 16FR SF-S16-851

## (undated) DEVICE — DRSG DRAIN 4X4" 7086

## (undated) DEVICE — SUCTION MANIFOLD NEPTUNE 2 SYS 4 PORT 0702-020-000

## (undated) DEVICE — LINEN GOWN XLG 5407

## (undated) DEVICE — GLOVE PROTEXIS W/NEU-THERA 7.5  2D73TE75

## (undated) DEVICE — SU MONOCRYL 4-0 PS-2 27" UND Y426H

## (undated) DEVICE — SU VICRYL 3-0 SH 27" UND J416H

## (undated) DEVICE — DRAPE POUCH INSTRUMENT 1018

## (undated) DEVICE — SYR 70ML TOOMEY 041170

## (undated) DEVICE — LINEN TOWEL PACK X5 5464

## (undated) DEVICE — PACK CYSTO CUSTOM ASC

## (undated) DEVICE — BAG URINARY DRAIN LUBRISIL IC 4000ML LF 253509A

## (undated) DEVICE — SYR 10ML FINGER CONTROL W/O NDL 309695

## (undated) DEVICE — COVER ULTRASOUND PROBE W/GEL FLEXI-FEEL 6"X58" LF  25-FF658

## (undated) DEVICE — SOL NACL 0.9% IRRIG 1000ML BOTTLE 2F7124

## (undated) DEVICE — DRAIN PENROSE 3/8X18" LATEX 0918020

## (undated) DEVICE — ESU GROUND PAD ADULT W/CORD E7507

## (undated) DEVICE — SUCTION TIP YANKAUER STR K87

## (undated) DEVICE — SYR BULB IRRIG 50ML LATEX FREE 0035280

## (undated) DEVICE — DRAPE LEGGINGS CLEAR 8430

## (undated) DEVICE — NDL PERC ACCESS 18GX20CM M0067001220

## (undated) DEVICE — PREP SKIN SCRUB TRAY 4461A

## (undated) DEVICE — NDL COUNTER 20CT 31142493

## (undated) DEVICE — SYR 10ML SLIP TIP W/O NDL 303134

## (undated) DEVICE — DRAPE MAYO STAND 23X54 8337

## (undated) DEVICE — LINEN TOWEL PACK X30 5481

## (undated) DEVICE — DRAPE TIBURON TOP SHEET 100X60" 29352

## (undated) DEVICE — CONNECTOR WATER VALVE PERFUSION PACK STR 020272801

## (undated) DEVICE — DRAIN PENROSE 1/4X18" LATEX

## (undated) DEVICE — DRAPE GYN/UROLOGY FLUID POUCH TUR 29455

## (undated) DEVICE — LINEN TOWEL PACK X6 WHITE 5487

## (undated) DEVICE — BONE WAX 2.5GM W31G

## (undated) DEVICE — DRSG GAUZE 4X4" TRAY 6939

## (undated) DEVICE — SUCTION MANIFOLD DORNOCH ULTRA CART UL-CL500

## (undated) DEVICE — PAD CHUX UNDERPAD 30X30"

## (undated) DEVICE — NDL 25GA 1.5" 305127

## (undated) DEVICE — SU PDS II 5-0 RB-1 DA 30" Z320H

## (undated) DEVICE — DRAPE LAP W/ARMBOARD 29410

## (undated) DEVICE — SOL WATER IRRIG 1000ML BOTTLE 2F7114

## (undated) DEVICE — Device

## (undated) DEVICE — JELLY LUBRICATING SURGILUBE 2OZ TUBE

## (undated) DEVICE — BLADE KNIFE SURG 15 371115

## (undated) DEVICE — SU VICRYL 3-0 SH 27" J316H

## (undated) DEVICE — SUPPORTER ATHLETIC LG LATEX 202636

## (undated) DEVICE — SU PDS II 4-0 RB-1 27" Z304H

## (undated) DEVICE — RAD KNIFE HANDLE W/11 BLADE DISPOSABLE 371611

## (undated) DEVICE — SUTURE BOOTS 051003PBX

## (undated) DEVICE — DRAPE SPLIT SHEET 77X108 REINFORCED 29436

## (undated) DEVICE — PREP POVIDONE IODINE SCRUB 7.5% 4OZ APL82212

## (undated) DEVICE — ESU CORD BIPOLAR GREEN 10-4000

## (undated) DEVICE — SYR 50ML LL W/O NDL 309653

## (undated) DEVICE — PAD CHUX UNDERPAD 23X24" 7136

## (undated) DEVICE — PANTIES MESH LG/XLG 2PK 706M2

## (undated) RX ORDER — ONDANSETRON 2 MG/ML
INJECTION INTRAMUSCULAR; INTRAVENOUS
Status: DISPENSED
Start: 2018-06-29

## (undated) RX ORDER — HYDROMORPHONE HYDROCHLORIDE 1 MG/ML
INJECTION, SOLUTION INTRAMUSCULAR; INTRAVENOUS; SUBCUTANEOUS
Status: DISPENSED
Start: 2018-08-16

## (undated) RX ORDER — EPHEDRINE SULFATE 50 MG/ML
INJECTION, SOLUTION INTRAMUSCULAR; INTRAVENOUS; SUBCUTANEOUS
Status: DISPENSED
Start: 2018-08-16

## (undated) RX ORDER — FENTANYL CITRATE 50 UG/ML
INJECTION, SOLUTION INTRAMUSCULAR; INTRAVENOUS
Status: DISPENSED
Start: 2018-08-16

## (undated) RX ORDER — GABAPENTIN 300 MG/1
CAPSULE ORAL
Status: DISPENSED
Start: 2018-06-29

## (undated) RX ORDER — DEXAMETHASONE SODIUM PHOSPHATE 4 MG/ML
INJECTION, SOLUTION INTRA-ARTICULAR; INTRALESIONAL; INTRAMUSCULAR; INTRAVENOUS; SOFT TISSUE
Status: DISPENSED
Start: 2018-06-29

## (undated) RX ORDER — LIDOCAINE HYDROCHLORIDE 20 MG/ML
INJECTION, SOLUTION EPIDURAL; INFILTRATION; INTRACAUDAL; PERINEURAL
Status: DISPENSED
Start: 2018-06-29

## (undated) RX ORDER — ACETAMINOPHEN 325 MG/1
TABLET ORAL
Status: DISPENSED
Start: 2018-06-29

## (undated) RX ORDER — PHENYLEPHRINE HCL IN 0.9% NACL 1 MG/10 ML
SYRINGE (ML) INTRAVENOUS
Status: DISPENSED
Start: 2018-08-16

## (undated) RX ORDER — OXYCODONE HYDROCHLORIDE 5 MG/1
TABLET ORAL
Status: DISPENSED
Start: 2018-08-16

## (undated) RX ORDER — PROPOFOL 10 MG/ML
INJECTION, EMULSION INTRAVENOUS
Status: DISPENSED
Start: 2018-06-29

## (undated) RX ORDER — BUPIVACAINE HYDROCHLORIDE 2.5 MG/ML
INJECTION, SOLUTION EPIDURAL; INFILTRATION; INTRACAUDAL
Status: DISPENSED
Start: 2018-06-29

## (undated) RX ORDER — ONDANSETRON 2 MG/ML
INJECTION INTRAMUSCULAR; INTRAVENOUS
Status: DISPENSED
Start: 2018-08-16